# Patient Record
Sex: FEMALE | Race: WHITE | Employment: FULL TIME | URBAN - METROPOLITAN AREA
[De-identification: names, ages, dates, MRNs, and addresses within clinical notes are randomized per-mention and may not be internally consistent; named-entity substitution may affect disease eponyms.]

---

## 2019-10-02 ENCOUNTER — HOSPITAL ENCOUNTER (EMERGENCY)
Age: 29
Discharge: HOME | End: 2019-10-03
Attending: EMERGENCY MEDICINE
Payer: COMMERCIAL

## 2019-10-02 DIAGNOSIS — R30.0 DIFFICULT OR PAINFUL URINATION: ICD-10-CM

## 2019-10-02 DIAGNOSIS — R10.31 RIGHT LOWER QUADRANT ABDOMINAL PAIN: Primary | ICD-10-CM

## 2019-10-02 DIAGNOSIS — R10.9 RIGHT FLANK PAIN: ICD-10-CM

## 2019-10-02 DIAGNOSIS — R11.0 NAUSEA: ICD-10-CM

## 2019-10-02 PROCEDURE — 80053 COMPREHEN METABOLIC PANEL: CPT

## 2019-10-02 PROCEDURE — 84703 CHORIONIC GONADOTROPIN ASSAY: CPT

## 2019-10-02 PROCEDURE — 85025 COMPLETE CBC W/AUTO DIFF WBC: CPT

## 2019-10-02 PROCEDURE — 99284 EMERGENCY DEPT VISIT MOD MDM: CPT

## 2019-10-02 PROCEDURE — 83690 ASSAY OF LIPASE: CPT

## 2019-10-02 RX ORDER — CYCLOBENZAPRINE HCL 10 MG
10 TABLET ORAL 3 TIMES DAILY PRN
COMMUNITY

## 2019-10-02 RX ORDER — IBUPROFEN 800 MG/1
800 TABLET ORAL EVERY 6 HOURS PRN
COMMUNITY
End: 2019-10-03

## 2019-10-02 RX ORDER — ACETAMINOPHEN 160 MG
TABLET,DISINTEGRATING ORAL
Status: ON HOLD | COMMUNITY
End: 2020-12-23

## 2019-10-02 RX ORDER — ONDANSETRON 2 MG/ML
4 INJECTION INTRAMUSCULAR; INTRAVENOUS ONCE
Status: COMPLETED | OUTPATIENT
Start: 2019-10-03 | End: 2019-10-03

## 2019-10-02 RX ORDER — ESCITALOPRAM OXALATE 20 MG/1
20 TABLET ORAL DAILY
COMMUNITY

## 2019-10-02 RX ORDER — KETOROLAC TROMETHAMINE 30 MG/ML
30 INJECTION, SOLUTION INTRAMUSCULAR; INTRAVENOUS ONCE
Status: COMPLETED | OUTPATIENT
Start: 2019-10-03 | End: 2019-10-03

## 2019-10-02 ASSESSMENT — PAIN DESCRIPTION - LOCATION: LOCATION: ABDOMEN;FLANK;BACK

## 2019-10-02 ASSESSMENT — PAIN SCALES - GENERAL: PAINLEVEL_OUTOF10: 8

## 2019-10-03 ENCOUNTER — APPOINTMENT (OUTPATIENT)
Dept: CT IMAGING | Age: 29
End: 2019-10-03
Payer: COMMERCIAL

## 2019-10-03 VITALS
WEIGHT: 250 LBS | TEMPERATURE: 97.3 F | RESPIRATION RATE: 17 BRPM | HEART RATE: 84 BPM | HEIGHT: 62 IN | DIASTOLIC BLOOD PRESSURE: 70 MMHG | SYSTOLIC BLOOD PRESSURE: 112 MMHG | OXYGEN SATURATION: 95 % | BODY MASS INDEX: 46.01 KG/M2

## 2019-10-03 LAB
A/G RATIO: 1 (ref 1.1–2.2)
ALBUMIN SERPL-MCNC: 4.4 G/DL (ref 3.4–5)
ALP BLD-CCNC: 115 U/L (ref 40–129)
ALT SERPL-CCNC: 27 U/L (ref 10–40)
ANION GAP SERPL CALCULATED.3IONS-SCNC: 14 MMOL/L (ref 3–16)
AST SERPL-CCNC: 16 U/L (ref 15–37)
BASOPHILS ABSOLUTE: 0.1 K/UL (ref 0–0.2)
BASOPHILS RELATIVE PERCENT: 0.6 %
BILIRUB SERPL-MCNC: 0.3 MG/DL (ref 0–1)
BILIRUBIN URINE: NEGATIVE
BLOOD, URINE: NEGATIVE
BUN BLDV-MCNC: 18 MG/DL (ref 7–20)
CALCIUM SERPL-MCNC: 9.9 MG/DL (ref 8.3–10.6)
CHLORIDE BLD-SCNC: 100 MMOL/L (ref 99–110)
CLARITY: CLEAR
CO2: 23 MMOL/L (ref 21–32)
COLOR: YELLOW
CREAT SERPL-MCNC: 0.6 MG/DL (ref 0.6–1.1)
EOSINOPHILS ABSOLUTE: 0.2 K/UL (ref 0–0.6)
EOSINOPHILS RELATIVE PERCENT: 2.3 %
GFR AFRICAN AMERICAN: >60
GFR NON-AFRICAN AMERICAN: >60
GLOBULIN: 4.2 G/DL
GLUCOSE BLD-MCNC: 141 MG/DL (ref 70–99)
GLUCOSE URINE: NEGATIVE MG/DL
HCG QUALITATIVE: NEGATIVE
HCT VFR BLD CALC: 43 % (ref 36–48)
HEMOGLOBIN: 14.4 G/DL (ref 12–16)
KETONES, URINE: NEGATIVE MG/DL
LEUKOCYTE ESTERASE, URINE: NEGATIVE
LIPASE: 25 U/L (ref 13–60)
LYMPHOCYTES ABSOLUTE: 3 K/UL (ref 1–5.1)
LYMPHOCYTES RELATIVE PERCENT: 28.3 %
MCH RBC QN AUTO: 27.8 PG (ref 26–34)
MCHC RBC AUTO-ENTMCNC: 33.5 G/DL (ref 31–36)
MCV RBC AUTO: 83 FL (ref 80–100)
MICROSCOPIC EXAMINATION: NORMAL
MONOCYTES ABSOLUTE: 0.5 K/UL (ref 0–1.3)
MONOCYTES RELATIVE PERCENT: 4.4 %
NEUTROPHILS ABSOLUTE: 6.8 K/UL (ref 1.7–7.7)
NEUTROPHILS RELATIVE PERCENT: 64.4 %
NITRITE, URINE: NEGATIVE
PDW BLD-RTO: 15.1 % (ref 12.4–15.4)
PH UA: 5.5 (ref 5–8)
PLATELET # BLD: 250 K/UL (ref 135–450)
PMV BLD AUTO: 7.8 FL (ref 5–10.5)
POTASSIUM SERPL-SCNC: 3.8 MMOL/L (ref 3.5–5.1)
PROTEIN UA: NEGATIVE MG/DL
RBC # BLD: 5.18 M/UL (ref 4–5.2)
SODIUM BLD-SCNC: 137 MMOL/L (ref 136–145)
SPECIFIC GRAVITY UA: >=1.03 (ref 1–1.03)
TOTAL PROTEIN: 8.6 G/DL (ref 6.4–8.2)
URINE REFLEX TO CULTURE: NORMAL
URINE TYPE: NORMAL
UROBILINOGEN, URINE: 0.2 E.U./DL
WBC # BLD: 10.6 K/UL (ref 4–11)

## 2019-10-03 PROCEDURE — 96375 TX/PRO/DX INJ NEW DRUG ADDON: CPT

## 2019-10-03 PROCEDURE — 81003 URINALYSIS AUTO W/O SCOPE: CPT

## 2019-10-03 PROCEDURE — 2580000003 HC RX 258: Performed by: PHYSICIAN ASSISTANT

## 2019-10-03 PROCEDURE — 96361 HYDRATE IV INFUSION ADD-ON: CPT

## 2019-10-03 PROCEDURE — 74176 CT ABD & PELVIS W/O CONTRAST: CPT

## 2019-10-03 PROCEDURE — 6360000002 HC RX W HCPCS: Performed by: PHYSICIAN ASSISTANT

## 2019-10-03 PROCEDURE — 96374 THER/PROPH/DIAG INJ IV PUSH: CPT

## 2019-10-03 RX ORDER — ONDANSETRON 4 MG/1
4 TABLET, ORALLY DISINTEGRATING ORAL EVERY 8 HOURS PRN
Qty: 16 TABLET | Refills: 0 | Status: ON HOLD | OUTPATIENT
Start: 2019-10-03 | End: 2021-01-16 | Stop reason: HOSPADM

## 2019-10-03 RX ORDER — IBUPROFEN 800 MG/1
800 TABLET ORAL EVERY 8 HOURS PRN
Qty: 21 TABLET | Refills: 0 | Status: ON HOLD | OUTPATIENT
Start: 2019-10-03 | End: 2020-11-04 | Stop reason: HOSPADM

## 2019-10-03 RX ORDER — 0.9 % SODIUM CHLORIDE 0.9 %
1000 INTRAVENOUS SOLUTION INTRAVENOUS ONCE
Status: COMPLETED | OUTPATIENT
Start: 2019-10-03 | End: 2019-10-03

## 2019-10-03 RX ADMIN — KETOROLAC TROMETHAMINE 30 MG: 30 INJECTION, SOLUTION INTRAMUSCULAR at 00:14

## 2019-10-03 RX ADMIN — SODIUM CHLORIDE 1000 ML: 9 INJECTION, SOLUTION INTRAVENOUS at 01:17

## 2019-10-03 RX ADMIN — ONDANSETRON 4 MG: 2 INJECTION INTRAMUSCULAR; INTRAVENOUS at 00:12

## 2019-10-03 ASSESSMENT — PAIN SCALES - GENERAL: PAINLEVEL_OUTOF10: 8

## 2019-10-26 ENCOUNTER — APPOINTMENT (OUTPATIENT)
Dept: ULTRASOUND IMAGING | Age: 29
End: 2019-10-26
Payer: COMMERCIAL

## 2019-10-26 ENCOUNTER — HOSPITAL ENCOUNTER (EMERGENCY)
Age: 29
Discharge: HOME | End: 2019-10-26
Attending: EMERGENCY MEDICINE
Payer: COMMERCIAL

## 2019-10-26 VITALS
RESPIRATION RATE: 16 BRPM | OXYGEN SATURATION: 100 % | SYSTOLIC BLOOD PRESSURE: 104 MMHG | DIASTOLIC BLOOD PRESSURE: 63 MMHG | HEIGHT: 63 IN | HEART RATE: 70 BPM | BODY MASS INDEX: 44.3 KG/M2 | TEMPERATURE: 98.7 F | WEIGHT: 250 LBS

## 2019-10-26 DIAGNOSIS — N83.202 LEFT OVARIAN CYST: Primary | ICD-10-CM

## 2019-10-26 LAB
A/G RATIO: 1 (ref 1.1–2.2)
ALBUMIN SERPL-MCNC: 4.2 G/DL (ref 3.4–5)
ALP BLD-CCNC: 105 U/L (ref 40–129)
ALT SERPL-CCNC: 26 U/L (ref 10–40)
ANION GAP SERPL CALCULATED.3IONS-SCNC: 11 MMOL/L (ref 3–16)
AST SERPL-CCNC: 20 U/L (ref 15–37)
BACTERIA WET PREP: ABNORMAL
BANDED NEUTROPHILS RELATIVE PERCENT: 1 % (ref 0–7)
BASOPHILS ABSOLUTE: 0 K/UL (ref 0–0.2)
BASOPHILS RELATIVE PERCENT: 0 %
BILIRUB SERPL-MCNC: 0.4 MG/DL (ref 0–1)
BILIRUBIN URINE: NEGATIVE
BLOOD, URINE: NEGATIVE
BUN BLDV-MCNC: 15 MG/DL (ref 7–20)
CALCIUM SERPL-MCNC: 9.6 MG/DL (ref 8.3–10.6)
CHLORIDE BLD-SCNC: 102 MMOL/L (ref 99–110)
CLARITY: CLEAR
CLUE CELLS: ABNORMAL
CO2: 24 MMOL/L (ref 21–32)
COLOR: YELLOW
CREAT SERPL-MCNC: 0.7 MG/DL (ref 0.6–1.1)
EOSINOPHILS ABSOLUTE: 0 K/UL (ref 0–0.6)
EOSINOPHILS RELATIVE PERCENT: 0 %
EPITHELIAL CELLS WET PREP: ABNORMAL
GFR AFRICAN AMERICAN: >60
GFR NON-AFRICAN AMERICAN: >60
GLOBULIN: 4.2 G/DL
GLUCOSE BLD-MCNC: 80 MG/DL (ref 70–99)
GLUCOSE URINE: NEGATIVE MG/DL
GONADOTROPIN, CHORIONIC (HCG) QUANT: <5 MIU/ML
HCG(URINE) PREGNANCY TEST: NEGATIVE
HCT VFR BLD CALC: 41.8 % (ref 36–48)
HEMOGLOBIN: 13.9 G/DL (ref 12–16)
KETONES, URINE: NEGATIVE MG/DL
LEUKOCYTE ESTERASE, URINE: NEGATIVE
LYMPHOCYTES ABSOLUTE: 2.9 K/UL (ref 1–5.1)
LYMPHOCYTES RELATIVE PERCENT: 26 %
MCH RBC QN AUTO: 27.8 PG (ref 26–34)
MCHC RBC AUTO-ENTMCNC: 33.3 G/DL (ref 31–36)
MCV RBC AUTO: 83.4 FL (ref 80–100)
MICROSCOPIC EXAMINATION: NORMAL
MONOCYTES ABSOLUTE: 0.4 K/UL (ref 0–1.3)
MONOCYTES RELATIVE PERCENT: 4 %
NEUTROPHILS ABSOLUTE: 7.7 K/UL (ref 1.7–7.7)
NEUTROPHILS RELATIVE PERCENT: 69 %
NITRITE, URINE: NEGATIVE
PDW BLD-RTO: 14.9 % (ref 12.4–15.4)
PH UA: 5.5 (ref 5–8)
PLATELET # BLD: 216 K/UL (ref 135–450)
PLATELET SLIDE REVIEW: ADEQUATE
PMV BLD AUTO: 7.7 FL (ref 5–10.5)
POTASSIUM REFLEX MAGNESIUM: 4 MMOL/L (ref 3.5–5.1)
PROTEIN UA: NEGATIVE MG/DL
RBC # BLD: 5.01 M/UL (ref 4–5.2)
RBC WET PREP: ABNORMAL
SLIDE REVIEW: NORMAL
SODIUM BLD-SCNC: 137 MMOL/L (ref 136–145)
SOURCE WET PREP: ABNORMAL
SPECIFIC GRAVITY UA: >=1.03 (ref 1–1.03)
TOTAL PROTEIN: 8.4 G/DL (ref 6.4–8.2)
TRICHOMONAS PREP: ABNORMAL
URINE REFLEX TO CULTURE: NORMAL
URINE TYPE: NORMAL
UROBILINOGEN, URINE: 0.2 E.U./DL
WBC # BLD: 11 K/UL (ref 4–11)
WBC WET PREP: ABNORMAL
YEAST WET PREP: ABNORMAL

## 2019-10-26 PROCEDURE — 85025 COMPLETE CBC W/AUTO DIFF WBC: CPT

## 2019-10-26 PROCEDURE — 76830 TRANSVAGINAL US NON-OB: CPT

## 2019-10-26 PROCEDURE — 80053 COMPREHEN METABOLIC PANEL: CPT

## 2019-10-26 PROCEDURE — 76856 US EXAM PELVIC COMPLETE: CPT

## 2019-10-26 PROCEDURE — 99284 EMERGENCY DEPT VISIT MOD MDM: CPT

## 2019-10-26 PROCEDURE — 81003 URINALYSIS AUTO W/O SCOPE: CPT

## 2019-10-26 PROCEDURE — 84703 CHORIONIC GONADOTROPIN ASSAY: CPT

## 2019-10-26 PROCEDURE — 96374 THER/PROPH/DIAG INJ IV PUSH: CPT

## 2019-10-26 PROCEDURE — 6360000002 HC RX W HCPCS: Performed by: EMERGENCY MEDICINE

## 2019-10-26 PROCEDURE — 87491 CHLMYD TRACH DNA AMP PROBE: CPT

## 2019-10-26 PROCEDURE — 87591 N.GONORRHOEAE DNA AMP PROB: CPT

## 2019-10-26 PROCEDURE — 84702 CHORIONIC GONADOTROPIN TEST: CPT

## 2019-10-26 PROCEDURE — 87210 SMEAR WET MOUNT SALINE/INK: CPT

## 2019-10-26 RX ORDER — KETOROLAC TROMETHAMINE 30 MG/ML
15 INJECTION, SOLUTION INTRAMUSCULAR; INTRAVENOUS ONCE
Status: COMPLETED | OUTPATIENT
Start: 2019-10-26 | End: 2019-10-26

## 2019-10-26 RX ORDER — ONDANSETRON 4 MG/1
4 TABLET, FILM COATED ORAL DAILY PRN
Qty: 15 TABLET | Refills: 0 | Status: SHIPPED | OUTPATIENT
Start: 2019-10-26

## 2019-10-26 RX ADMIN — KETOROLAC TROMETHAMINE 15 MG: 30 INJECTION, SOLUTION INTRAMUSCULAR at 14:30

## 2019-10-26 ASSESSMENT — PAIN DESCRIPTION - DESCRIPTORS: DESCRIPTORS: CRAMPING;SHARP;SHOOTING

## 2019-10-26 ASSESSMENT — PAIN DESCRIPTION - LOCATION: LOCATION: ABDOMEN

## 2019-10-26 ASSESSMENT — PAIN DESCRIPTION - PAIN TYPE: TYPE: ACUTE PAIN

## 2019-10-26 ASSESSMENT — PAIN SCALES - GENERAL
PAINLEVEL_OUTOF10: 8
PAINLEVEL_OUTOF10: 8

## 2019-10-26 ASSESSMENT — PAIN DESCRIPTION - ORIENTATION: ORIENTATION: LOWER

## 2019-10-28 LAB
C TRACH DNA GENITAL QL NAA+PROBE: NEGATIVE
N. GONORRHOEAE DNA: NEGATIVE

## 2020-10-30 ENCOUNTER — HOSPITAL ENCOUNTER (OUTPATIENT)
Age: 30
Discharge: HOME | End: 2020-10-30
Payer: COMMERCIAL

## 2020-10-30 PROCEDURE — U0003 INFECTIOUS AGENT DETECTION BY NUCLEIC ACID (DNA OR RNA); SEVERE ACUTE RESPIRATORY SYNDROME CORONAVIRUS 2 (SARS-COV-2) (CORONAVIRUS DISEASE [COVID-19]), AMPLIFIED PROBE TECHNIQUE, MAKING USE OF HIGH THROUGHPUT TECHNOLOGIES AS DESCRIBED BY CMS-2020-01-R: HCPCS

## 2020-10-30 PROCEDURE — U0002 COVID-19 LAB TEST NON-CDC: HCPCS

## 2020-10-31 LAB — SARS-COV-2, PCR: NOT DETECTED

## 2020-11-02 ENCOUNTER — ANESTHESIA EVENT (OUTPATIENT)
Dept: OPERATING ROOM | Age: 30
End: 2020-11-02
Payer: COMMERCIAL

## 2020-11-04 ENCOUNTER — ANESTHESIA (OUTPATIENT)
Dept: OPERATING ROOM | Age: 30
End: 2020-11-04
Payer: COMMERCIAL

## 2020-11-04 ENCOUNTER — HOSPITAL ENCOUNTER (OUTPATIENT)
Age: 30
Setting detail: OUTPATIENT SURGERY
Discharge: HOME | End: 2020-11-04
Attending: PODIATRIST | Admitting: PODIATRIST
Payer: COMMERCIAL

## 2020-11-04 VITALS
HEIGHT: 63 IN | OXYGEN SATURATION: 96 % | SYSTOLIC BLOOD PRESSURE: 125 MMHG | WEIGHT: 254 LBS | HEART RATE: 90 BPM | BODY MASS INDEX: 45 KG/M2 | RESPIRATION RATE: 16 BRPM | DIASTOLIC BLOOD PRESSURE: 71 MMHG | TEMPERATURE: 97 F

## 2020-11-04 VITALS
SYSTOLIC BLOOD PRESSURE: 133 MMHG | DIASTOLIC BLOOD PRESSURE: 62 MMHG | TEMPERATURE: 95.5 F | RESPIRATION RATE: 10 BRPM | OXYGEN SATURATION: 97 %

## 2020-11-04 LAB — PREGNANCY, URINE: NEGATIVE

## 2020-11-04 PROCEDURE — 7100000001 HC PACU RECOVERY - ADDTL 15 MIN: Performed by: PODIATRIST

## 2020-11-04 PROCEDURE — 2720000010 HC SURG SUPPLY STERILE: Performed by: PODIATRIST

## 2020-11-04 PROCEDURE — C1713 ANCHOR/SCREW BN/BN,TIS/BN: HCPCS | Performed by: PODIATRIST

## 2020-11-04 PROCEDURE — 6360000002 HC RX W HCPCS: Performed by: ANESTHESIOLOGY

## 2020-11-04 PROCEDURE — 84703 CHORIONIC GONADOTROPIN ASSAY: CPT

## 2020-11-04 PROCEDURE — 2709999900 HC NON-CHARGEABLE SUPPLY: Performed by: PODIATRIST

## 2020-11-04 PROCEDURE — 6360000002 HC RX W HCPCS: Performed by: PODIATRIST

## 2020-11-04 PROCEDURE — 7100000011 HC PHASE II RECOVERY - ADDTL 15 MIN: Performed by: PODIATRIST

## 2020-11-04 PROCEDURE — 2580000003 HC RX 258: Performed by: ANESTHESIOLOGY

## 2020-11-04 PROCEDURE — 3700000000 HC ANESTHESIA ATTENDED CARE: Performed by: PODIATRIST

## 2020-11-04 PROCEDURE — 3600000003 HC SURGERY LEVEL 3 BASE: Performed by: PODIATRIST

## 2020-11-04 PROCEDURE — 7100000000 HC PACU RECOVERY - FIRST 15 MIN: Performed by: PODIATRIST

## 2020-11-04 PROCEDURE — 3700000001 HC ADD 15 MINUTES (ANESTHESIA): Performed by: PODIATRIST

## 2020-11-04 PROCEDURE — 2500000003 HC RX 250 WO HCPCS: Performed by: PODIATRIST

## 2020-11-04 PROCEDURE — 6360000002 HC RX W HCPCS: Performed by: NURSE ANESTHETIST, CERTIFIED REGISTERED

## 2020-11-04 PROCEDURE — 2500000003 HC RX 250 WO HCPCS: Performed by: NURSE ANESTHETIST, CERTIFIED REGISTERED

## 2020-11-04 PROCEDURE — 3600000013 HC SURGERY LEVEL 3 ADDTL 15MIN: Performed by: PODIATRIST

## 2020-11-04 PROCEDURE — 7100000010 HC PHASE II RECOVERY - FIRST 15 MIN: Performed by: PODIATRIST

## 2020-11-04 DEVICE — ANCHOR SUT DIA2.9MM NO2 MAXBRAID DBL LD SFT W/ CUT NDL: Type: IMPLANTABLE DEVICE | Site: FOOT | Status: FUNCTIONAL

## 2020-11-04 DEVICE — IMPLANTABLE DEVICE: Type: IMPLANTABLE DEVICE | Site: FOOT | Status: FUNCTIONAL

## 2020-11-04 RX ORDER — ONDANSETRON 2 MG/ML
4 INJECTION INTRAMUSCULAR; INTRAVENOUS EVERY 10 MIN PRN
Status: DISCONTINUED | OUTPATIENT
Start: 2020-11-04 | End: 2020-11-04 | Stop reason: HOSPADM

## 2020-11-04 RX ORDER — LIDOCAINE HYDROCHLORIDE 20 MG/ML
INJECTION, SOLUTION EPIDURAL; INFILTRATION; INTRACAUDAL; PERINEURAL PRN
Status: DISCONTINUED | OUTPATIENT
Start: 2020-11-04 | End: 2020-11-04 | Stop reason: SDUPTHER

## 2020-11-04 RX ORDER — LABETALOL HYDROCHLORIDE 5 MG/ML
5 INJECTION, SOLUTION INTRAVENOUS EVERY 10 MIN PRN
Status: DISCONTINUED | OUTPATIENT
Start: 2020-11-04 | End: 2020-11-04 | Stop reason: HOSPADM

## 2020-11-04 RX ORDER — FENTANYL CITRATE 50 UG/ML
INJECTION, SOLUTION INTRAMUSCULAR; INTRAVENOUS PRN
Status: DISCONTINUED | OUTPATIENT
Start: 2020-11-04 | End: 2020-11-04 | Stop reason: SDUPTHER

## 2020-11-04 RX ORDER — SODIUM CHLORIDE, SODIUM LACTATE, POTASSIUM CHLORIDE, CALCIUM CHLORIDE 600; 310; 30; 20 MG/100ML; MG/100ML; MG/100ML; MG/100ML
INJECTION, SOLUTION INTRAVENOUS CONTINUOUS
Status: DISCONTINUED | OUTPATIENT
Start: 2020-11-04 | End: 2020-11-04 | Stop reason: HOSPADM

## 2020-11-04 RX ORDER — SODIUM CHLORIDE 0.9 % (FLUSH) 0.9 %
10 SYRINGE (ML) INJECTION PRN
Status: DISCONTINUED | OUTPATIENT
Start: 2020-11-04 | End: 2020-11-04 | Stop reason: HOSPADM

## 2020-11-04 RX ORDER — HYDRALAZINE HYDROCHLORIDE 20 MG/ML
5 INJECTION INTRAMUSCULAR; INTRAVENOUS EVERY 10 MIN PRN
Status: DISCONTINUED | OUTPATIENT
Start: 2020-11-04 | End: 2020-11-04 | Stop reason: HOSPADM

## 2020-11-04 RX ORDER — LIDOCAINE HYDROCHLORIDE 10 MG/ML
INJECTION, SOLUTION EPIDURAL; INFILTRATION; INTRACAUDAL; PERINEURAL
Status: COMPLETED
Start: 2020-11-04 | End: 2020-11-04

## 2020-11-04 RX ORDER — PROPOFOL 10 MG/ML
INJECTION, EMULSION INTRAVENOUS PRN
Status: DISCONTINUED | OUTPATIENT
Start: 2020-11-04 | End: 2020-11-04 | Stop reason: SDUPTHER

## 2020-11-04 RX ORDER — SODIUM CHLORIDE 0.9 % (FLUSH) 0.9 %
10 SYRINGE (ML) INJECTION EVERY 12 HOURS SCHEDULED
Status: DISCONTINUED | OUTPATIENT
Start: 2020-11-04 | End: 2020-11-04 | Stop reason: HOSPADM

## 2020-11-04 RX ORDER — DEXAMETHASONE SODIUM PHOSPHATE 4 MG/ML
INJECTION, SOLUTION INTRA-ARTICULAR; INTRALESIONAL; INTRAMUSCULAR; INTRAVENOUS; SOFT TISSUE PRN
Status: DISCONTINUED | OUTPATIENT
Start: 2020-11-04 | End: 2020-11-04 | Stop reason: SDUPTHER

## 2020-11-04 RX ORDER — ONDANSETRON 2 MG/ML
INJECTION INTRAMUSCULAR; INTRAVENOUS PRN
Status: DISCONTINUED | OUTPATIENT
Start: 2020-11-04 | End: 2020-11-04 | Stop reason: SDUPTHER

## 2020-11-04 RX ORDER — OXYCODONE HYDROCHLORIDE AND ACETAMINOPHEN 5; 325 MG/1; MG/1
2 TABLET ORAL PRN
Status: DISCONTINUED | OUTPATIENT
Start: 2020-11-04 | End: 2020-11-04 | Stop reason: HOSPADM

## 2020-11-04 RX ORDER — OXYCODONE HYDROCHLORIDE AND ACETAMINOPHEN 5; 325 MG/1; MG/1
1 TABLET ORAL PRN
Status: DISCONTINUED | OUTPATIENT
Start: 2020-11-04 | End: 2020-11-04 | Stop reason: HOSPADM

## 2020-11-04 RX ORDER — BUPIVACAINE HYDROCHLORIDE 5 MG/ML
INJECTION, SOLUTION EPIDURAL; INTRACAUDAL PRN
Status: DISCONTINUED | OUTPATIENT
Start: 2020-11-04 | End: 2020-11-04 | Stop reason: ALTCHOICE

## 2020-11-04 RX ADMIN — ONDANSETRON 4 MG: 2 INJECTION, SOLUTION INTRAMUSCULAR; INTRAVENOUS at 07:25

## 2020-11-04 RX ADMIN — LIDOCAINE HYDROCHLORIDE 60 MG: 20 INJECTION, SOLUTION EPIDURAL; INFILTRATION; INTRACAUDAL; PERINEURAL at 07:25

## 2020-11-04 RX ADMIN — FENTANYL CITRATE 25 MCG: 50 INJECTION INTRAMUSCULAR; INTRAVENOUS at 09:21

## 2020-11-04 RX ADMIN — Medication 2 G: at 07:25

## 2020-11-04 RX ADMIN — FENTANYL CITRATE 25 MCG: 50 INJECTION INTRAMUSCULAR; INTRAVENOUS at 08:38

## 2020-11-04 RX ADMIN — PROPOFOL 200 MG: 10 INJECTION, EMULSION INTRAVENOUS at 07:25

## 2020-11-04 RX ADMIN — SODIUM CHLORIDE, POTASSIUM CHLORIDE, SODIUM LACTATE AND CALCIUM CHLORIDE: 600; 310; 30; 20 INJECTION, SOLUTION INTRAVENOUS at 06:34

## 2020-11-04 RX ADMIN — FENTANYL CITRATE 50 MCG: 50 INJECTION INTRAMUSCULAR; INTRAVENOUS at 07:25

## 2020-11-04 RX ADMIN — FENTANYL CITRATE 25 MCG: 50 INJECTION INTRAMUSCULAR; INTRAVENOUS at 08:06

## 2020-11-04 RX ADMIN — PROPOFOL 100 MG: 10 INJECTION, EMULSION INTRAVENOUS at 07:42

## 2020-11-04 RX ADMIN — LIDOCAINE HYDROCHLORIDE 0.1 ML: 10 INJECTION, SOLUTION EPIDURAL; INFILTRATION; INTRACAUDAL; PERINEURAL at 06:33

## 2020-11-04 RX ADMIN — FENTANYL CITRATE 25 MCG: 50 INJECTION INTRAMUSCULAR; INTRAVENOUS at 07:39

## 2020-11-04 RX ADMIN — DEXAMETHASONE SODIUM PHOSPHATE 10 MG: 4 INJECTION, SOLUTION INTRAMUSCULAR; INTRAVENOUS at 07:25

## 2020-11-04 RX ADMIN — HYDROMORPHONE HYDROCHLORIDE 0.5 MG: 1 INJECTION, SOLUTION INTRAMUSCULAR; INTRAVENOUS; SUBCUTANEOUS at 10:16

## 2020-11-04 RX ADMIN — FENTANYL CITRATE 25 MCG: 50 INJECTION INTRAMUSCULAR; INTRAVENOUS at 07:28

## 2020-11-04 RX ADMIN — HYDROMORPHONE HYDROCHLORIDE 0.5 MG: 1 INJECTION, SOLUTION INTRAMUSCULAR; INTRAVENOUS; SUBCUTANEOUS at 10:06

## 2020-11-04 RX ADMIN — FENTANYL CITRATE 25 MCG: 50 INJECTION INTRAMUSCULAR; INTRAVENOUS at 08:00

## 2020-11-04 ASSESSMENT — PULMONARY FUNCTION TESTS
PIF_VALUE: 6
PIF_VALUE: 8
PIF_VALUE: 7
PIF_VALUE: 7
PIF_VALUE: 1
PIF_VALUE: 7
PIF_VALUE: 7
PIF_VALUE: 8
PIF_VALUE: 10
PIF_VALUE: 7
PIF_VALUE: 0
PIF_VALUE: 7
PIF_VALUE: 8
PIF_VALUE: 6
PIF_VALUE: 8
PIF_VALUE: 7
PIF_VALUE: 5
PIF_VALUE: 6
PIF_VALUE: 7
PIF_VALUE: 7
PIF_VALUE: 25
PIF_VALUE: 7
PIF_VALUE: 6
PIF_VALUE: 7
PIF_VALUE: 5
PIF_VALUE: 7
PIF_VALUE: 8
PIF_VALUE: 11
PIF_VALUE: 7
PIF_VALUE: 22
PIF_VALUE: 2
PIF_VALUE: 10
PIF_VALUE: 7
PIF_VALUE: 7
PIF_VALUE: 8
PIF_VALUE: 7
PIF_VALUE: 6
PIF_VALUE: 8
PIF_VALUE: 12
PIF_VALUE: 1
PIF_VALUE: 6
PIF_VALUE: 6
PIF_VALUE: 18
PIF_VALUE: 7
PIF_VALUE: 8
PIF_VALUE: 7
PIF_VALUE: 5
PIF_VALUE: 7
PIF_VALUE: 8
PIF_VALUE: 8
PIF_VALUE: 6
PIF_VALUE: 8
PIF_VALUE: 7
PIF_VALUE: 8
PIF_VALUE: 8
PIF_VALUE: 7
PIF_VALUE: 6
PIF_VALUE: 7
PIF_VALUE: 8
PIF_VALUE: 7
PIF_VALUE: 7
PIF_VALUE: 38
PIF_VALUE: 7
PIF_VALUE: 7
PIF_VALUE: 6
PIF_VALUE: 7
PIF_VALUE: 7
PIF_VALUE: 6
PIF_VALUE: 5
PIF_VALUE: 2
PIF_VALUE: 7
PIF_VALUE: 8
PIF_VALUE: 6
PIF_VALUE: 7
PIF_VALUE: 5
PIF_VALUE: 8
PIF_VALUE: 7
PIF_VALUE: 8
PIF_VALUE: 7
PIF_VALUE: 7
PIF_VALUE: 8
PIF_VALUE: 8
PIF_VALUE: 7
PIF_VALUE: 8
PIF_VALUE: 2
PIF_VALUE: 7
PIF_VALUE: 8
PIF_VALUE: 7
PIF_VALUE: 3
PIF_VALUE: 7
PIF_VALUE: 7
PIF_VALUE: 5
PIF_VALUE: 7
PIF_VALUE: 0
PIF_VALUE: 8
PIF_VALUE: 8
PIF_VALUE: 5
PIF_VALUE: 8
PIF_VALUE: 7
PIF_VALUE: 5
PIF_VALUE: 7
PIF_VALUE: 8
PIF_VALUE: 8
PIF_VALUE: 10
PIF_VALUE: 7
PIF_VALUE: 6
PIF_VALUE: 8
PIF_VALUE: 5
PIF_VALUE: 7
PIF_VALUE: 8
PIF_VALUE: 7
PIF_VALUE: 23
PIF_VALUE: 6
PIF_VALUE: 7
PIF_VALUE: 3
PIF_VALUE: 7
PIF_VALUE: 5
PIF_VALUE: 7
PIF_VALUE: 5
PIF_VALUE: 7
PIF_VALUE: 7
PIF_VALUE: 8
PIF_VALUE: 5
PIF_VALUE: 7

## 2020-11-04 ASSESSMENT — PAIN SCALES - GENERAL
PAINLEVEL_OUTOF10: 7
PAINLEVEL_OUTOF10: 7
PAINLEVEL_OUTOF10: 4

## 2020-11-04 ASSESSMENT — PAIN DESCRIPTION - ORIENTATION
ORIENTATION: LEFT

## 2020-11-04 ASSESSMENT — PAIN DESCRIPTION - DESCRIPTORS
DESCRIPTORS: ACHING;PRESSURE
DESCRIPTORS: ACHING;PRESSURE
DESCRIPTORS: DULL;ACHING;CONSTANT
DESCRIPTORS: ACHING;PRESSURE

## 2020-11-04 ASSESSMENT — PAIN DESCRIPTION - PAIN TYPE
TYPE: SURGICAL PAIN

## 2020-11-04 ASSESSMENT — PAIN - FUNCTIONAL ASSESSMENT
PAIN_FUNCTIONAL_ASSESSMENT: ACTIVITIES ARE NOT PREVENTED
PAIN_FUNCTIONAL_ASSESSMENT: 0-10

## 2020-11-04 ASSESSMENT — PAIN DESCRIPTION - LOCATION
LOCATION: FOOT

## 2020-11-04 NOTE — OP NOTE
[address]                 [address] [address]                                OPERATIVE REPORT    PATIENT NAME: Caitlin Rollins                        :        1990  MED REC NO:   [id number]                          ROOM:  ACCOUNT NO:   [de-identified]                           ADMIT DATE: 2020  PROVIDER:     Cheryl Schwarz DPM    DATE OF PROCEDURE:  2020    PREOPERATIVE DIAGNOSES:  1.  Subtalar coalition. 2.  Calcaneonavicular coalition. 3.  Severe valgus deformity with posterior tibial tendonitis. POSTOPERATIVE DIAGNOSES:  1.  Subtalar coalition. 2.  Calcaneonavicular coalition. 3.  Attenuated spring ligament and posterior tibial tendon and scarred  lateral ankle ligament. OPERATION PERFORMED:  1. Subtalar joint coalition resection and subtalar joint fusion. 2.  Calcaneonavicular coalition resection. 3.  Kidner procedure with reefing of the spring ligament. SURGEON:  Glenny Burton DPM    ANESTHESIA:  General.    HEMOSTASIS:  Pneumatic calf tourniquet at 275 mmHg. EBL:  Less than 100 mL. MATERIALS USED:  See the brief op note for the hardware and supplies  used. OPERATIVE PROCEDURE:  The patient was brought to the operating room,  placed on the operating table in supine position. The patient was then  placed under general anesthesia. Left foot and ankle were scrubbed and  draped in usual sterile aseptic manner. An Esmarch bandage was used to  exsanguinate the left foot and ankle, and a well-padded calf tourniquet  was inflated to 275 mmHg. PROCEDURE #1:  Resection of subtalar coalition and subtalar joint  fusion:  Attention was directed at the medial aspect of the foot and  ankle. Approximately 6 cm incision was placed over the medial aspect of  the foot and ankle, which was carried down deep to the skin and  subcutaneous tissue. All bleeding vessels were ligated.   Next, the  posterior tibial tendon was identified and retracted plantarly. An  incision was made along the medial aspect of the subtalar joint, or  where the joint would be, no obvious joint was noted due to bony  coalition in this area. The lateral aspect of the joint was identified  using C-arm. The medial coalition was resected. A wedge of bone was  resected from talus and calcaneus, so the entire joint can be mobilized. Significant scarring was noted along the lateral foot and ankle,  although this had to be released including scarring along the lateral  subtalar joint and ankle joint to mobilize this joint. Once the joint  was mobilized, the calcaneus was inverted to bring into rectus to 2  degrees of valgus position. A reconstructive wedge from [employer] was used  to hold this correction. Good alignment of the heel, talus and the leg  were noted. Subtalar joint was then fused using standard AO techniques  and 5-0 screw. Good stabilization of this joint was noted. PROCEDURE #2:  Attention was directed at the dorsolateral aspect of the  left foot. Approximately 6 cm incision was placed here as well. Careful sharp and dull dissection was carried down deep to the skin and  subcutaneous tissue. The extensor digitorum brevis muscle was  identified and its origin was reflected off of the calcaneus exposing  the anterior process of the calcaneus, which was noted to have bony  coalition with the lateral aspect of navicular. Using sagittal saw and  osteotome, this coalition was resected. This procedure was performed  prior to fixation of the subtalar joint to help mobilize the entire foot  and bring it more into a neutral position and out of the valgus  position. Care was taken to remove all of the coalition all the way to  the plantar aspect of both these bones to help mobilize the foot. After  adequate resection and fixation of the subtalar joint, the lateral  aspect of the incision was closed in layer fashion.   Since the foot was  in severe valgus and was brought into rectus position, significant  tension was noted in the tissues in this area including the extensor  digitorum brevis muscle as well as subcutaneous tissue and the skin. These were closed in layer fashion using 3-0 Vicryl sutures and 3-0  nylon sutures. PROCEDURE #3:  Kidner procedure:  Attention was directed at the medial  soft tissue structure. Significantly attenuated posterior tibial tendon  was noted and at this length, the tendon would not be functional, so  Kidner procedure was performed. The tendon was resected off the medial  navicular tuberosity and reattached using an anchor it was reattached in more anatomically tension. The spring ligament was also tightened  medially. Next, the wound was irrigated with copious amount of normal  saline. 3-0 Vicryl sutures were used to reapproximate the peritenon  over the posterior tibial tendon. 3-0 Vicryl sutures were also used to  reapproximate the subcutaneous tissue and 4-0 Vicryl sutures were used  to reapproximate the skin edges in a continuous subcuticular manner. Next,  approximately 20 mL of 0.5% Marcaine was injected into the foot and  ankle. Calf tourniquet was deflated at this time. Circulation was  noted to return to left foot almost instantaneously. Mildly compressive  dressing consisting of Adaptic, 4 x 4, and Ivett were applied followed  by posterior splint at 90 degrees and Ace bandages. The patient  tolerated anesthesia and procedure well and left the operating room with  vital signs stable and neurovascular status intact.         Ayah Andrews DPM    D: 11/04/2020 9:59:38       T: 11/04/2020 12:51:06     [email or web address]  Job#: [id number]     Doc#: [id number]    CC:

## 2020-11-04 NOTE — ANESTHESIA PRE PROCEDURE
Department of Anesthesiology  Preprocedure Note       Name:  Steve Levine   Age:  26 y.o.  :  1990                                          MRN:  [id number]         Date:  2020      Surgeon: Bridgette Morales):  Eri Johnston DPM    Procedure: Procedure(s):  SUBTALAR JOINT FUSION, CALCANEO-NAVICULAR COALITION RESECTION LEFT FOOT    Medications prior to admission:   Prior to Admission medications    Medication Sig Start Date End Date Taking? Authorizing Provider   BUPROPION HCL PO Take by mouth   Yes Historical Provider, MD   ondansetron (ZOFRAN) 4 MG tablet Take 1 tablet by mouth daily as needed for Nausea or Vomiting 10/26/19   Lucille Lion, DO   ibuprofen (ADVIL;MOTRIN) 800 MG tablet Take 1 tablet by mouth every 8 hours as needed for Pain With food 10/3/19 10/26/19  David Lares, DO   ondansetron (ZOFRAN ODT) 4 MG disintegrating tablet Take 1 tablet by mouth every 8 hours as needed for Nausea or Vomiting 10/3/19   David Lraes, DO   cyclobenzaprine (FLEXERIL) 10 MG tablet Take 10 mg by mouth 3 times daily as needed for Muscle spasms    Historical Provider, MD   escitalopram (LEXAPRO) 20 MG tablet Take 20 mg by mouth daily    Historical Provider, MD   Cholecalciferol (VITAMIN D3) 2000 units CAPS Take by mouth    Historical Provider, MD   beclomethasone (QVAR REDIHALER) 80 MCG/ACT AERB inhaler Inhale 1 puff into the lungs 2 times daily    Historical Provider, MD       Current medications:    No current facility-administered medications for this encounter.       Current Outpatient Medications   Medication Sig Dispense Refill    BUPROPION HCL PO Take by mouth      ondansetron (ZOFRAN) 4 MG tablet Take 1 tablet by mouth daily as needed for Nausea or Vomiting 15 tablet 0    ibuprofen (ADVIL;MOTRIN) 800 MG tablet Take 1 tablet by mouth every 8 hours as needed for Pain With food 21 tablet 0    ondansetron (ZOFRAN ODT) 4 MG disintegrating tablet Take 1 tablet by mouth every 8 hours as needed for Nausea or Vomiting 16 tablet 0    cyclobenzaprine (FLEXERIL) 10 MG tablet Take 10 mg by mouth 3 times daily as needed for Muscle spasms      escitalopram (LEXAPRO) 20 MG tablet Take 20 mg by mouth daily      Cholecalciferol (VITAMIN D3) 2000 units CAPS Take by mouth      beclomethasone (QVAR REDIHALER) 80 MCG/ACT AERB inhaler Inhale 1 puff into the lungs 2 times daily         Allergies:  No Known Allergies    Problem List:  There is no problem list on file for this patient. Past Medical History:        Diagnosis Date    Anxiety     Asthma     Depression     Kidney stone     Vitamin D deficiency        Past Surgical History:        Procedure Laterality Date    LEEP         Social History:    Social History     Tobacco Use    Smoking status: Former Smoker     Packs/day: 0.25     Types: Cigarettes    Smokeless tobacco: Never Used   Substance Use Topics    Alcohol use: Yes     Comment: rare                                Counseling given: Not Answered      Vital Signs (Current):   Vitals:    11/02/20 1024   Weight: 254 lb (115.2 kg)   Height: 5' 3\" (1.6 m)                                              BP Readings from Last 3 Encounters:   10/26/19 104/63   10/03/19 112/70       NPO Status:                                                                                 BMI:   Wt Readings from Last 3 Encounters:   11/02/20 254 lb (115.2 kg)   10/26/19 250 lb (113.4 kg)   10/02/19 250 lb (113.4 kg)     Body mass index is 44.99 kg/m².     CBC:   Lab Results   Component Value Date    WBC 11.0 10/26/2019    RBC 5.01 10/26/2019    HGB 13.9 10/26/2019    HCT 41.8 10/26/2019    MCV 83.4 10/26/2019    RDW 14.9 10/26/2019     10/26/2019       CMP:   Lab Results   Component Value Date     10/26/2019    K 4.0 10/26/2019     10/26/2019    CO2 24 10/26/2019    BUN 15 10/26/2019    CREATININE 0.7 10/26/2019    GFRAA >60 10/26/2019    AGRATIO 1.0 10/26/2019    LABGLOM >60 10/26/2019    GLUCOSE 80 10/26/2019 PROT 8.4 10/26/2019    CALCIUM 9.6 10/26/2019    BILITOT 0.4 10/26/2019    ALKPHOS 105 10/26/2019    AST 20 10/26/2019    ALT 26 10/26/2019       POC Tests: No results for input(s): POCGLU, POCNA, POCK, POCCL, POCBUN, POCHEMO, POCHCT in the last 72 hours. Coags: No results found for: PROTIME, INR, APTT    HCG (If Applicable):   Lab Results   Component Value Date    PREGTESTUR Negative 10/26/2019        ABGs: No results found for: PHART, PO2ART, QNA5BRL, NYT7NHZ, BEART, H7JCZVWV     Type & Screen (If Applicable):  No results found for: LABABO, LABRH    Drug/Infectious Status (If Applicable):  No results found for: HIV, HEPCAB    COVID-19 Screening (If Applicable):   Lab Results   Component Value Date    COVID19 Not Detected 10/30/2020         Anesthesia Evaluation  Patient summary reviewed and Nursing notes reviewed no history of anesthetic complications:   Airway: Mallampati: II  TM distance: >3 FB   Neck ROM: full  Mouth opening: > = 3 FB Dental: normal exam         Pulmonary:   (+) asthma:                            Cardiovascular:Negative CV ROS                      Neuro/Psych:   (+) psychiatric history:            GI/Hepatic/Renal: Neg GI/Hepatic/Renal ROS       (-) GERD, liver disease and no renal disease       Endo/Other: Negative Endo/Other ROS       (-) diabetes mellitus               Abdominal:           Vascular: negative vascular ROS. Anesthesia Plan      general     ASA 3     (I discussed with the patient the risks and benefits of PIV, general anesthesia, IV Narcotics, PACU. All questions were answered the patient agrees with the plan)  Induction: intravenous. MIPS: Prophylactic antiemetics administered. Anesthetic plan and risks discussed with patient. Plan discussed with CRNA.                   Zamzam Anne MD   11/4/2020

## 2020-11-04 NOTE — BRIEF OP NOTE
Operative Note      Patient: Alicia Colby  YOB: 1990  MRN: [id number]    Date of Procedure: 11/4/2020    Pre-Op Diagnosis: subtalar coalition and djd, calc-nuv colation    Post-Op Diagnosis: Same       Procedure(s):  SUBTALAR JOINT FUSION, CALCANEO-NAVICULAR COALITION RESECTION LEFT FOOT   Kidner procedure    Surgeon(s):  Ana Maria Naidu DPM    Assistant:   * No surgical staff found *    Anesthesia: General    Estimated Blood Loss (mL): less than 229     Complications: None    Specimens:   * No specimens in log *    Implants:  Implant Name Type Inv. Item Serial No.  Lot No. LRB No. Used Action   [employer] [id number] Left 1 Implanted   [employer] [id number] Left 1 Implanted   reconstructive wedge    [employer] [id number] Left 1 Implanted   screw    [employer]  Left 1 Implanted   ANCHOR SUT DIA2. 9MM NO2 MAXBRAID DBL LD SFT W/ CUT NDL  ANCHOR SUT DIA2. 9MM NO2 MAXBRAID DBL LD SFT W/ CUT NDL  [employer] [id number] Left 1 Implanted   RAYSA SCR THD 5.0X75MM  RAYSA SCR THD 5.0X75MM  [employer]  Left 1 Implanted         Drains: * No LDAs found *    Findings: PT tendon and spring ligaments were noted to be attenuated, lateral ankle ligaments were noted to be fibrosed and tight.       Electronically signed by Ana Maria Naidu DPM on 11/4/2020 at 9:48 AM

## 2020-12-18 ENCOUNTER — HOSPITAL ENCOUNTER (OUTPATIENT)
Age: 30
Discharge: HOME | End: 2020-12-18
Payer: COMMERCIAL

## 2020-12-18 LAB — SARS-COV-2: NOT DETECTED

## 2020-12-18 PROCEDURE — U0003 INFECTIOUS AGENT DETECTION BY NUCLEIC ACID (DNA OR RNA); SEVERE ACUTE RESPIRATORY SYNDROME CORONAVIRUS 2 (SARS-COV-2) (CORONAVIRUS DISEASE [COVID-19]), AMPLIFIED PROBE TECHNIQUE, MAKING USE OF HIGH THROUGHPUT TECHNOLOGIES AS DESCRIBED BY CMS-2020-01-R: HCPCS

## 2020-12-23 ENCOUNTER — ANESTHESIA (OUTPATIENT)
Dept: OPERATING ROOM | Age: 30
End: 2020-12-23
Payer: COMMERCIAL

## 2020-12-23 ENCOUNTER — HOSPITAL ENCOUNTER (OUTPATIENT)
Age: 30
Setting detail: OUTPATIENT SURGERY
Discharge: HOME | End: 2020-12-23
Attending: PODIATRIST | Admitting: PODIATRIST
Payer: COMMERCIAL

## 2020-12-23 ENCOUNTER — ANESTHESIA EVENT (OUTPATIENT)
Dept: OPERATING ROOM | Age: 30
End: 2020-12-23
Payer: COMMERCIAL

## 2020-12-23 VITALS
TEMPERATURE: 97.5 F | RESPIRATION RATE: 15 BRPM | SYSTOLIC BLOOD PRESSURE: 132 MMHG | BODY MASS INDEX: 42.52 KG/M2 | DIASTOLIC BLOOD PRESSURE: 82 MMHG | OXYGEN SATURATION: 98 % | HEART RATE: 86 BPM | WEIGHT: 240 LBS | HEIGHT: 63 IN

## 2020-12-23 VITALS
RESPIRATION RATE: 19 BRPM | SYSTOLIC BLOOD PRESSURE: 115 MMHG | OXYGEN SATURATION: 100 % | TEMPERATURE: 98.2 F | DIASTOLIC BLOOD PRESSURE: 63 MMHG

## 2020-12-23 LAB — PREGNANCY, URINE: NEGATIVE

## 2020-12-23 PROCEDURE — 7100000011 HC PHASE II RECOVERY - ADDTL 15 MIN: Performed by: PODIATRIST

## 2020-12-23 PROCEDURE — 6360000002 HC RX W HCPCS: Performed by: PODIATRIST

## 2020-12-23 PROCEDURE — 2500000003 HC RX 250 WO HCPCS: Performed by: PODIATRIST

## 2020-12-23 PROCEDURE — 6360000002 HC RX W HCPCS: Performed by: NURSE ANESTHETIST, CERTIFIED REGISTERED

## 2020-12-23 PROCEDURE — 2500000003 HC RX 250 WO HCPCS: Performed by: NURSE ANESTHETIST, CERTIFIED REGISTERED

## 2020-12-23 PROCEDURE — 84703 CHORIONIC GONADOTROPIN ASSAY: CPT

## 2020-12-23 PROCEDURE — 2709999900 HC NON-CHARGEABLE SUPPLY: Performed by: PODIATRIST

## 2020-12-23 PROCEDURE — 3700000001 HC ADD 15 MINUTES (ANESTHESIA): Performed by: PODIATRIST

## 2020-12-23 PROCEDURE — 3600000004 HC SURGERY LEVEL 4 BASE: Performed by: PODIATRIST

## 2020-12-23 PROCEDURE — 6370000000 HC RX 637 (ALT 250 FOR IP): Performed by: PODIATRIST

## 2020-12-23 PROCEDURE — 7100000000 HC PACU RECOVERY - FIRST 15 MIN: Performed by: PODIATRIST

## 2020-12-23 PROCEDURE — 3600000014 HC SURGERY LEVEL 4 ADDTL 15MIN: Performed by: PODIATRIST

## 2020-12-23 PROCEDURE — 2580000003 HC RX 258: Performed by: ANESTHESIOLOGY

## 2020-12-23 PROCEDURE — C1713 ANCHOR/SCREW BN/BN,TIS/BN: HCPCS | Performed by: PODIATRIST

## 2020-12-23 PROCEDURE — 2500000003 HC RX 250 WO HCPCS: Performed by: ANESTHESIOLOGY

## 2020-12-23 PROCEDURE — 7100000010 HC PHASE II RECOVERY - FIRST 15 MIN: Performed by: PODIATRIST

## 2020-12-23 PROCEDURE — 3700000000 HC ANESTHESIA ATTENDED CARE: Performed by: PODIATRIST

## 2020-12-23 PROCEDURE — 7100000001 HC PACU RECOVERY - ADDTL 15 MIN: Performed by: PODIATRIST

## 2020-12-23 DEVICE — IMPLANTABLE DEVICE: Type: IMPLANTABLE DEVICE | Status: FUNCTIONAL

## 2020-12-23 RX ORDER — OXYCODONE HYDROCHLORIDE AND ACETAMINOPHEN 5; 325 MG/1; MG/1
1 TABLET ORAL PRN
Status: DISCONTINUED | OUTPATIENT
Start: 2020-12-23 | End: 2020-12-23 | Stop reason: HOSPADM

## 2020-12-23 RX ORDER — OXYCODONE HYDROCHLORIDE AND ACETAMINOPHEN 5; 325 MG/1; MG/1
2 TABLET ORAL PRN
Status: DISCONTINUED | OUTPATIENT
Start: 2020-12-23 | End: 2020-12-23 | Stop reason: HOSPADM

## 2020-12-23 RX ORDER — SODIUM CHLORIDE 0.9 % (FLUSH) 0.9 %
10 SYRINGE (ML) INJECTION EVERY 12 HOURS SCHEDULED
Status: DISCONTINUED | OUTPATIENT
Start: 2020-12-23 | End: 2020-12-23 | Stop reason: HOSPADM

## 2020-12-23 RX ORDER — FENTANYL CITRATE 50 UG/ML
INJECTION, SOLUTION INTRAMUSCULAR; INTRAVENOUS PRN
Status: DISCONTINUED | OUTPATIENT
Start: 2020-12-23 | End: 2020-12-23 | Stop reason: SDUPTHER

## 2020-12-23 RX ORDER — HYDRALAZINE HYDROCHLORIDE 20 MG/ML
5 INJECTION INTRAMUSCULAR; INTRAVENOUS EVERY 10 MIN PRN
Status: DISCONTINUED | OUTPATIENT
Start: 2020-12-23 | End: 2020-12-23 | Stop reason: HOSPADM

## 2020-12-23 RX ORDER — MORPHINE SULFATE 2 MG/ML
2 INJECTION, SOLUTION INTRAMUSCULAR; INTRAVENOUS EVERY 5 MIN PRN
Status: DISCONTINUED | OUTPATIENT
Start: 2020-12-23 | End: 2020-12-23 | Stop reason: HOSPADM

## 2020-12-23 RX ORDER — DEXAMETHASONE SODIUM PHOSPHATE 4 MG/ML
INJECTION, SOLUTION INTRA-ARTICULAR; INTRALESIONAL; INTRAMUSCULAR; INTRAVENOUS; SOFT TISSUE PRN
Status: DISCONTINUED | OUTPATIENT
Start: 2020-12-23 | End: 2020-12-23 | Stop reason: SDUPTHER

## 2020-12-23 RX ORDER — LABETALOL HYDROCHLORIDE 5 MG/ML
5 INJECTION, SOLUTION INTRAVENOUS EVERY 10 MIN PRN
Status: DISCONTINUED | OUTPATIENT
Start: 2020-12-23 | End: 2020-12-23 | Stop reason: HOSPADM

## 2020-12-23 RX ORDER — BUPIVACAINE HYDROCHLORIDE 5 MG/ML
INJECTION, SOLUTION EPIDURAL; INTRACAUDAL PRN
Status: DISCONTINUED | OUTPATIENT
Start: 2020-12-23 | End: 2020-12-23 | Stop reason: ALTCHOICE

## 2020-12-23 RX ORDER — SODIUM CHLORIDE, SODIUM LACTATE, POTASSIUM CHLORIDE, CALCIUM CHLORIDE 600; 310; 30; 20 MG/100ML; MG/100ML; MG/100ML; MG/100ML
INJECTION, SOLUTION INTRAVENOUS CONTINUOUS
Status: DISCONTINUED | OUTPATIENT
Start: 2020-12-23 | End: 2020-12-23 | Stop reason: HOSPADM

## 2020-12-23 RX ORDER — PROMETHAZINE HYDROCHLORIDE 25 MG/ML
6.25 INJECTION, SOLUTION INTRAMUSCULAR; INTRAVENOUS
Status: DISCONTINUED | OUTPATIENT
Start: 2020-12-23 | End: 2020-12-23 | Stop reason: HOSPADM

## 2020-12-23 RX ORDER — ACETAMINOPHEN 500 MG
1000 TABLET ORAL ONCE
Status: COMPLETED | OUTPATIENT
Start: 2020-12-23 | End: 2020-12-23

## 2020-12-23 RX ORDER — MORPHINE SULFATE 2 MG/ML
1 INJECTION, SOLUTION INTRAMUSCULAR; INTRAVENOUS EVERY 5 MIN PRN
Status: DISCONTINUED | OUTPATIENT
Start: 2020-12-23 | End: 2020-12-23 | Stop reason: HOSPADM

## 2020-12-23 RX ORDER — ACETAMINOPHEN 500 MG
TABLET ORAL
Status: DISCONTINUED
Start: 2020-12-23 | End: 2020-12-23 | Stop reason: HOSPADM

## 2020-12-23 RX ORDER — SODIUM CHLORIDE 0.9 % (FLUSH) 0.9 %
10 SYRINGE (ML) INJECTION PRN
Status: DISCONTINUED | OUTPATIENT
Start: 2020-12-23 | End: 2020-12-23 | Stop reason: HOSPADM

## 2020-12-23 RX ORDER — ONDANSETRON 2 MG/ML
4 INJECTION INTRAMUSCULAR; INTRAVENOUS PRN
Status: DISCONTINUED | OUTPATIENT
Start: 2020-12-23 | End: 2020-12-23 | Stop reason: HOSPADM

## 2020-12-23 RX ORDER — ONDANSETRON 2 MG/ML
INJECTION INTRAMUSCULAR; INTRAVENOUS PRN
Status: DISCONTINUED | OUTPATIENT
Start: 2020-12-23 | End: 2020-12-23 | Stop reason: SDUPTHER

## 2020-12-23 RX ORDER — LIDOCAINE HYDROCHLORIDE 10 MG/ML
0.3 INJECTION, SOLUTION EPIDURAL; INFILTRATION; INTRACAUDAL; PERINEURAL
Status: COMPLETED | OUTPATIENT
Start: 2020-12-23 | End: 2020-12-23

## 2020-12-23 RX ORDER — LIDOCAINE HYDROCHLORIDE 20 MG/ML
INJECTION, SOLUTION EPIDURAL; INFILTRATION; INTRACAUDAL; PERINEURAL PRN
Status: DISCONTINUED | OUTPATIENT
Start: 2020-12-23 | End: 2020-12-23 | Stop reason: SDUPTHER

## 2020-12-23 RX ORDER — DIPHENHYDRAMINE HYDROCHLORIDE 50 MG/ML
12.5 INJECTION INTRAMUSCULAR; INTRAVENOUS
Status: DISCONTINUED | OUTPATIENT
Start: 2020-12-23 | End: 2020-12-23 | Stop reason: HOSPADM

## 2020-12-23 RX ORDER — PROPOFOL 10 MG/ML
INJECTION, EMULSION INTRAVENOUS PRN
Status: DISCONTINUED | OUTPATIENT
Start: 2020-12-23 | End: 2020-12-23 | Stop reason: SDUPTHER

## 2020-12-23 RX ADMIN — Medication 2 G: at 08:40

## 2020-12-23 RX ADMIN — FENTANYL CITRATE 50 MCG: 50 INJECTION INTRAMUSCULAR; INTRAVENOUS at 09:43

## 2020-12-23 RX ADMIN — FENTANYL CITRATE 50 MCG: 50 INJECTION INTRAMUSCULAR; INTRAVENOUS at 09:13

## 2020-12-23 RX ADMIN — LIDOCAINE HYDROCHLORIDE 100 MG: 20 INJECTION, SOLUTION EPIDURAL; INFILTRATION; INTRACAUDAL; PERINEURAL at 08:50

## 2020-12-23 RX ADMIN — FENTANYL CITRATE 100 MCG: 50 INJECTION INTRAMUSCULAR; INTRAVENOUS at 08:50

## 2020-12-23 RX ADMIN — DEXAMETHASONE SODIUM PHOSPHATE 8 MG: 4 INJECTION, SOLUTION INTRAMUSCULAR; INTRAVENOUS at 08:56

## 2020-12-23 RX ADMIN — SODIUM CHLORIDE, POTASSIUM CHLORIDE, SODIUM LACTATE AND CALCIUM CHLORIDE: 600; 310; 30; 20 INJECTION, SOLUTION INTRAVENOUS at 07:27

## 2020-12-23 RX ADMIN — PROPOFOL 400 MG: 10 INJECTION, EMULSION INTRAVENOUS at 08:50

## 2020-12-23 RX ADMIN — LIDOCAINE HYDROCHLORIDE 0.1 ML: 10 INJECTION, SOLUTION EPIDURAL; INFILTRATION; INTRACAUDAL; PERINEURAL at 07:27

## 2020-12-23 RX ADMIN — ONDANSETRON 4 MG: 2 INJECTION, SOLUTION INTRAMUSCULAR; INTRAVENOUS at 08:56

## 2020-12-23 RX ADMIN — ACETAMINOPHEN 1000 MG: 500 TABLET, FILM COATED ORAL at 07:17

## 2020-12-23 ASSESSMENT — PULMONARY FUNCTION TESTS
PIF_VALUE: 12
PIF_VALUE: 11
PIF_VALUE: 6
PIF_VALUE: 5
PIF_VALUE: 11
PIF_VALUE: 22
PIF_VALUE: 5
PIF_VALUE: 6
PIF_VALUE: 10
PIF_VALUE: 5
PIF_VALUE: 2
PIF_VALUE: 4
PIF_VALUE: 11
PIF_VALUE: 3
PIF_VALUE: 2
PIF_VALUE: 4
PIF_VALUE: 2
PIF_VALUE: 5
PIF_VALUE: 11
PIF_VALUE: 11
PIF_VALUE: 17
PIF_VALUE: 1
PIF_VALUE: 3
PIF_VALUE: 2
PIF_VALUE: 9
PIF_VALUE: 6
PIF_VALUE: 11
PIF_VALUE: 6
PIF_VALUE: 14
PIF_VALUE: 10
PIF_VALUE: 11
PIF_VALUE: 4
PIF_VALUE: 15
PIF_VALUE: 15
PIF_VALUE: 1
PIF_VALUE: 11
PIF_VALUE: 5
PIF_VALUE: 13
PIF_VALUE: 5
PIF_VALUE: 3
PIF_VALUE: 5
PIF_VALUE: 4
PIF_VALUE: 11
PIF_VALUE: 4
PIF_VALUE: 6
PIF_VALUE: 5
PIF_VALUE: 4
PIF_VALUE: 11
PIF_VALUE: 4
PIF_VALUE: 10
PIF_VALUE: 4
PIF_VALUE: 1
PIF_VALUE: 4
PIF_VALUE: 4
PIF_VALUE: 5
PIF_VALUE: 2
PIF_VALUE: 11
PIF_VALUE: 11
PIF_VALUE: 6
PIF_VALUE: 4
PIF_VALUE: 3
PIF_VALUE: 3
PIF_VALUE: 5
PIF_VALUE: 15
PIF_VALUE: 4
PIF_VALUE: 5
PIF_VALUE: 4
PIF_VALUE: 11
PIF_VALUE: 11
PIF_VALUE: 10
PIF_VALUE: 4
PIF_VALUE: 3
PIF_VALUE: 11
PIF_VALUE: 4
PIF_VALUE: 11
PIF_VALUE: 2
PIF_VALUE: 4
PIF_VALUE: 16
PIF_VALUE: 3
PIF_VALUE: 11
PIF_VALUE: 1
PIF_VALUE: 5

## 2020-12-23 ASSESSMENT — PAIN DESCRIPTION - DESCRIPTORS: DESCRIPTORS: ACHING;CONSTANT

## 2020-12-23 ASSESSMENT — PAIN SCALES - GENERAL
PAINLEVEL_OUTOF10: 0
PAINLEVEL_OUTOF10: 4
PAINLEVEL_OUTOF10: 0

## 2020-12-23 ASSESSMENT — PAIN - FUNCTIONAL ASSESSMENT
PAIN_FUNCTIONAL_ASSESSMENT: PREVENTS OR INTERFERES SOME ACTIVE ACTIVITIES AND ADLS
PAIN_FUNCTIONAL_ASSESSMENT: 0-10

## 2020-12-23 NOTE — OP NOTE
Operative Note      Patient: Mayra Marquez  YOB: 1990  MRN: [id number]    Date of Procedure: 12/23/2020    Pre-Op Diagnosis: Displacement of internal fixation device of bone of left lower leg, subsequent encounter    Post-Op Diagnosis: Same       Procedure(s):  REPAIR OF MALUNION LEFT FOOT    Surgeon(s):  Yadi Farias DPM    Assistant:   Surgical Assistant: Maxine Hudson    Anesthesia: General    Estimated Blood Loss (mL): less than 843     Complications: None    Specimens:   * No specimens in log *    Implants:  * No implants in log *      Drains: * No LDAs found *    Findings: Talus noted to be small with osteopenia    Detailed Description of Procedure:   Dictation #:  [id number]    Electronically signed by Yadi Farias DPM on 12/23/2020 at 10:15 AM

## 2020-12-23 NOTE — PROGRESS NOTES
Pt arrived from OR, report received, asleep from anesthesia, at bedside to monitor, vitals stable, patient denies pain

## 2020-12-23 NOTE — OP NOTE
[address]                 [address] [address]                                OPERATIVE REPORT    PATIENT NAME: Garth Benson                      :        1990  MED REC NO:   [id number]                          ROOM:  ACCOUNT NO:   [de-identified]                           ADMIT DATE: 2020  PROVIDER:     Cheryl Schwarz DPM    DATE OF PROCEDURE:  2020    PREOPERATIVE DIAGNOSIS:  Displacement of hardware with malalignment of  fusion site. POSTOPERATIVE DIAGNOSIS:  Displacement of hardware with malalignment of  fusion site. PROCEDURE PERFORMED:  Repair of malunion, left foot. SURGEON:  Cheryl Schwarz DPM    ASSISTANT:  Niraj Barnett. EBL: less than 50mL    MATERIALS USED:  Synthes 5-0, 60 mm long screw. OPERATIVE PROCEDURE:  The patient was brought to the operating room,  placed on the operating table in supine position. The patient was then  placed under general anesthesia. The left foot and ankle was scrubbed  and draped in usual sterile aseptic manner. An Esmarch bandage was used  to exsanguinate the left foot and ankle and a well-padded thigh  tourniquet was inflated to 350 mmHg. Attention was now directed at the  dorsal medial aspect of the left foot and ankle. A new incision was  placed over the talar neck area, prior incisions were not used due to  low quality of tissue noted in the area. The location of the screw was  identified on the C-arm and a 1.5 cm incision was placed over this area. Careful sharp and dull dissection was carried down deep through the skin  and subcutaneous tissue. The screw placement was identified and a  K-wire was inserted in this cannulated screw. The surrounding bone had  poor quality with easy breaking of bone noted throughout the talar neck  area. The screw was removed and K-wire was left for temporary fixation. Lateral aspect of the joint was evaluated with a 2-cm incision over this  area. Careful sharp and dull dissection was carried down deep through  the skin and subcutaneous tissue. Again, poor quality of bone was also  noted in this area. The subtalar implant was noted to be displaced in  the lateral gutter and it was decided to completely remove this. The  subtalar joint was then fixated with a 5-0 screw. Good fixation and  compression of the joint was noted. Area was evaluated and  consideration for another point of fixation was made. There was no  space on the talar neck for an additional screw and due to the soft  nature of the bone, also decision was made not to insert another screw  in the small portion of the talar neck or medially or laterally, there  was a concern that further compromise of the talus may compromise the  ankle joint. The wounds were irrigated with copious amount of normal  saline and subcutaneous tissue was reapproximated using 3-0 Vicryl  sutures and 4-0 Vicryl sutures were used to reapproximate the skin edges  in a continuous subcuticular manner. Next, 20 mL of 0.5% Marcaine was  injected for postoperative anesthesia and compression Dietz dressings  were applied to the left foot, ankle and leg followed by posterior  splint. Thigh tourniquet was deflated at this time and circulation was  noted to return to left foot almost instantaneously. The patient  tolerated anesthesia and procedure well and left the operating room with  vital signs stable and neurovascular status intact.         Selena Keita DPM    D: 12/23/2020 10:21:53       T: 12/23/2020 12:51:42     [email or web address]  Job#: [id number]     Doc#: [id number]    CC:

## 2020-12-23 NOTE — ANESTHESIA POSTPROCEDURE EVALUATION
Department of Anesthesiology  Postprocedure Note    Patient: Leandra William  MRN: [id number]  YOB: 1990  Date of evaluation: 12/23/2020  Time:  2:18 PM     Procedure Summary     Date: 12/23/20 Room / Location: [facility] EG OR 03 / [facility]    Anesthesia Start: 1885 Anesthesia Stop: 6792    Procedure: REPAIR OF MALUNION LEFT FOOT (Left Foot) Diagnosis:       Displacement of internal fixation device of bone of left lower leg, subsequent encounter      (Displacement of internal fixation device of bone of left lower leg, subsequent encounter)    Surgeons: Chely Aly DPM Responsible Provider: Jennifer Burkett MD    Anesthesia Type: general ASA Status: 3          Anesthesia Type: general    Gamaliel Phase I: Gamaliel Score: 10    Gamaliel Phase II: Gamaliel Score: 10    Last vitals: Reviewed and per EMR flowsheets.        Anesthesia Post Evaluation    Comments: Postoperative Anesthesia Note    Name:    Leandra William  MRN:      [id number]    Patient Vitals in the past 12 hrs:  12/23/20 1045, BP:132/82, Pulse:86, Resp:15, SpO2:98 %  12/23/20 1030, BP:116/77, Temp:97.5 °F (36.4 °C), Temp src:Temporal, Pulse:85, Resp:16, SpO2:98 %  12/23/20 1024, BP:125/77, Pulse:89, Resp:15, SpO2:97 %  12/23/20 1019, BP:123/75, Pulse:88, Resp:16, SpO2:97 %  12/23/20 1014, BP:118/74, Temp:97.1 °F (36.2 °C), Temp src:Temporal, Pulse:84, Resp:15, SpO2:97 %  12/23/20 0707, BP:(!) 138/91, Temp:97 °F (36.1 °C), Pulse:90, Resp:19, SpO2:100 %  12/23/20 0700, Temp src:Temporal     LABS:    CBC  Lab Results       Component                Value               Date/Time                  WBC                      11.0                10/26/2019 01:20 PM        HGB                      13.9                10/26/2019 01:20 PM        HCT                      41.8                10/26/2019 01:20 PM        PLT                      216                 10/26/2019 01:20 PM   RENAL  Lab Results Component                Value               Date/Time                  NA                       137                 10/26/2019 01:20 PM        K                        4.0                 10/26/2019 01:20 PM        CL                       102                 10/26/2019 01:20 PM        CO2                      24                  10/26/2019 01:20 PM        BUN                      15                  10/26/2019 01:20 PM        CREATININE               0.7                 10/26/2019 01:20 PM        GLUCOSE                  80                  10/26/2019 01:20 PM   COAGS  No results found for: PROTIME, INR, APTT    Intake & Output:  @24HRIO@    Nausea & Vomiting:  No    Level of Consciousness:  Awake    Pain Assessment:  Adequate analgesia    Anesthesia Complications:  No apparent anesthetic complications    SUMMARY      Vital signs stable  OK to discharge from Stage I post anesthesia care.   Care transferred from Anesthesiology department on discharge from perioperative area

## 2020-12-23 NOTE — H&P
Department of Podiatry          CHIEF COMPLAINT:  Subluxation of the STJ fusion site. HISTORY OF PRESENT ILLNESS:                The patient is a 26 y.o. female with recent STJ fusion with malposition and incomplete union. Past Medical History:        Diagnosis Date    Anxiety     Asthma     Depression     Kidney stone     Vitamin D deficiency      Past Surgical History:        Procedure Laterality Date    ARTHRODESIS Left 11/4/2020    SUBTALAR JOINT FUSION, CALCANEO-NAVICULAR COALITION RESECTION LEFT FOOT performed by Zoila Ochoa DPM at [address] [facility]       Current Medications:    Current Facility-Administered Medications: lactated ringers infusion, , Intravenous, Continuous  sodium chloride flush 0.9 % injection 10 mL, 10 mL, Intravenous, 2 times per day  sodium chloride flush 0.9 % injection 10 mL, 10 mL, Intravenous, PRN  acetaminophen (TYLENOL) 500 MG tablet, , ,   meperidine (DEMEROL) injection SOLN 12.5 mg, 12.5 mg, Intravenous, Q5 Min PRN  HYDROmorphone (DILAUDID) injection 0.25 mg, 0.25 mg, Intravenous, Q5 Min PRN  HYDROmorphone (DILAUDID) injection 0.5 mg, 0.5 mg, Intravenous, Q5 Min PRN  morphine (PF) injection 1 mg, 1 mg, Intravenous, Q5 Min PRN  morphine (PF) injection 2 mg, 2 mg, Intravenous, Q5 Min PRN  oxyCODONE-acetaminophen (PERCOCET) 5-325 MG per tablet 1 tablet, 1 tablet, Oral, PRN **OR** oxyCODONE-acetaminophen (PERCOCET) 5-325 MG per tablet 2 tablet, 2 tablet, Oral, PRN  ondansetron (ZOFRAN) injection 4 mg, 4 mg, Intravenous, PRN  promethazine (PHENERGAN) injection 6.25 mg, 6.25 mg, Intravenous, Q15 Min PRN  diphenhydrAMINE (BENADRYL) injection 12.5 mg, 12.5 mg, Intravenous, Once PRN  labetalol (NORMODYNE;TRANDATE) injection 5 mg, 5 mg, Intravenous, Q10 Min PRN  hydrALAZINE (APRESOLINE) injection 5 mg, 5 mg, Intravenous, Q10 Min PRN  bupivacaine (PF) (MARCAINE) 0.5 % injection, , , PRN  Allergies:  Patient has no known allergies.     Social History:    TOBACCO:   reports that she has quit smoking. Her smoking use included cigarettes. She smoked 0.25 packs per day. She has never used smokeless tobacco.  Family History:   History reviewed. No pertinent family history. REVIEW OF SYSTEMS:    WNL    Physical Exam:  BP (!) 138/91   Pulse 90   Temp 97 °F (36.1 °C)   Resp 19   Ht 5' 3\" (1.6 m)   Wt 240 lb (108.9 kg)   SpO2 100%   BMI 42.51 kg/m²   General appearance: alert, appears stated age and cooperative  Head: Normocephalic, without obvious abnormality, atraumatic  Eyes: conjunctivae/corneas clear. PERRL, EOM's intact. Neck: no adenopathy, supple  Lungs: clear to auscultation bilaterally  Heart: regular rate and rhythm, S1, S2 normal  Abdomen: soft, non-tender; bowel sounds normal; no masses,  no organomegaly  Extremities: extremities normal, atraumatic, no cyanosis or edema  Skin: Skin color, texture, turgor normal. No rashes or lesions  Neurologic: Grossly normal  SKIN:  Prior incisions are healed but some delay in healing noted. NEUROLOGIC:    Pain sensation isnot significantly changed Bilateral.  VASCULAR:    bilaterally Dorsalis pedis is 2. bilaterally Posterior tibial pulse is 2. 2+ edema left.      IMPRESSION/RECOMMENDATIONS:    - Proceed with planned revision of STJ fusion surgery

## 2020-12-23 NOTE — ANESTHESIA PRE PROCEDURE
Department of Anesthesiology  Preprocedure Note       Name:  Scarlet Wilson   Age:  26 y.o.  :  1990                                          MRN:  [id number]         Date:  2020      Surgeon: Yumi Russell):  Salena Mehta DPM    Procedure: Procedure(s):  REPAIR OF MALUNION LEFT FOOT    Medications prior to admission:   Prior to Admission medications    Medication Sig Start Date End Date Taking? Authorizing Provider   BUPROPION HCL PO Take by mouth   Yes Historical Provider, MD   ondansetron (ZOFRAN ODT) 4 MG disintegrating tablet Take 1 tablet by mouth every 8 hours as needed for Nausea or Vomiting 10/3/19  Yes Charlene Wan,    cyclobenzaprine (FLEXERIL) 10 MG tablet Take 10 mg by mouth 3 times daily as needed for Muscle spasms   Yes Historical Provider, MD   escitalopram (LEXAPRO) 20 MG tablet Take 20 mg by mouth daily   Yes Historical Provider, MD   ondansetron (ZOFRAN) 4 MG tablet Take 1 tablet by mouth daily as needed for Nausea or Vomiting 10/26/19   Delia Jolly,    beclomethasone (QVAR REDIHALER) 80 MCG/ACT AERB inhaler Inhale 1 puff into the lungs 2 times daily    Historical Provider, MD       Current medications:    Current Facility-Administered Medications   Medication Dose Route Frequency Provider Last Rate Last Admin    ceFAZolin (ANCEF) 2 g in sterile water 20 mL IV syringe  2 g Intravenous Once Glenny Pillai DPM        lactated ringers infusion   Intravenous Continuous Edson Ferguson  mL/hr at 20 0727 New Bag at 20 0727    sodium chloride flush 0.9 % injection 10 mL  10 mL Intravenous 2 times per day Edson Ferguson MD        sodium chloride flush 0.9 % injection 10 mL  10 mL Intravenous PRN Edson Ferguson MD        acetaminophen (TYLENOL) 500 MG tablet                Allergies:  No Known Allergies    Problem List:  There is no problem list on file for this patient.       Past Medical History:        Diagnosis Date    Anxiety     Asthma  Depression     Kidney stone     Vitamin D deficiency        Past Surgical History:        Procedure Laterality Date    ARTHRODESIS Left 11/4/2020    SUBTALAR JOINT FUSION, CALCANEO-NAVICULAR COALITION RESECTION LEFT FOOT performed by Hansa Chua DPM at [address] LEEP         Social History:    Social History     Tobacco Use    Smoking status: Former Smoker     Packs/day: 0.25     Types: Cigarettes    Smokeless tobacco: Never Used   Substance Use Topics    Alcohol use: Yes     Comment: rare                                Counseling given: Not Answered      Vital Signs (Current):   Vitals:    12/21/20 1503 12/23/20 0700 12/23/20 0707   BP:   (!) 138/91   Pulse:   90   Resp:   19   Temp:   97 °F (36.1 °C)   TempSrc:  Temporal    SpO2:   100%   Weight: 240 lb (108.9 kg)     Height: 5' 3\" (1.6 m)                                                BP Readings from Last 3 Encounters:   12/23/20 (!) 138/91   11/04/20 133/62   11/04/20 125/71       NPO Status: Time of last liquid consumption: 2200                        Time of last solid consumption: 2200                        Date of last liquid consumption: 12/22/20                        Date of last solid food consumption: 12/22/20    BMI:   Wt Readings from Last 3 Encounters:   12/21/20 240 lb (108.9 kg)   11/02/20 254 lb (115.2 kg)   10/26/19 250 lb (113.4 kg)     Body mass index is 42.51 kg/m².     CBC:   Lab Results   Component Value Date    WBC 11.0 10/26/2019    RBC 5.01 10/26/2019    HGB 13.9 10/26/2019    HCT 41.8 10/26/2019    MCV 83.4 10/26/2019    RDW 14.9 10/26/2019     10/26/2019       CMP:   Lab Results   Component Value Date     10/26/2019    K 4.0 10/26/2019     10/26/2019    CO2 24 10/26/2019    BUN 15 10/26/2019    CREATININE 0.7 10/26/2019    GFRAA >60 10/26/2019    AGRATIO 1.0 10/26/2019    LABGLOM >60 10/26/2019    GLUCOSE 80 10/26/2019    PROT 8.4 10/26/2019    CALCIUM 9.6 10/26/2019    BILITOT 0.4 10/26/2019 ALKPHOS 105 10/26/2019    AST 20 10/26/2019    ALT 26 10/26/2019       POC Tests: No results for input(s): POCGLU, POCNA, POCK, POCCL, POCBUN, POCHEMO, POCHCT in the last 72 hours. Coags: No results found for: PROTIME, INR, APTT    HCG (If Applicable):   Lab Results   Component Value Date    PREGTESTUR Negative 12/23/2020        ABGs: No results found for: PHART, PO2ART, SRT0ZAT, DMG8HUB, BEART, U4MBZDZP     Type & Screen (If Applicable):  No results found for: LABABO, LABRH    Drug/Infectious Status (If Applicable):  No results found for: HIV, HEPCAB    COVID-19 Screening (If Applicable):   Lab Results   Component Value Date    COVID19 Not Detected 12/18/2020    COVID19 Not Detected 10/30/2020         Anesthesia Evaluation  Patient summary reviewed no history of anesthetic complications:   Airway: Mallampati: II  TM distance: >3 FB   Neck ROM: full  Mouth opening: > = 3 FB Dental: normal exam         Pulmonary:Negative Pulmonary ROS and normal exam  breath sounds clear to auscultation  (+) asthma:                            Cardiovascular:Negative CV ROS  Exercise tolerance: good (>4 METS),           Rhythm: regular  Rate: normal                    Neuro/Psych:   Negative Neuro/Psych ROS              GI/Hepatic/Renal: Neg GI/Hepatic/Renal ROS  (+) morbid obesity          Endo/Other: Negative Endo/Other ROS                    Abdominal:           Vascular: negative vascular ROS. Pre-Operative Diagnosis: Displacement of internal fixation device of bone of left lower leg, subsequent encounter [T84.127D]    26 y.o.   BMI:  Body mass index is 42.51 kg/m².      Vitals:    12/21/20 1503 12/23/20 0700 12/23/20 0707   BP:   (!) 138/91   Pulse:   90   Resp:   19   Temp:   97 °F (36.1 °C)   TempSrc:  Temporal    SpO2:   100%   Weight: 240 lb (108.9 kg)     Height: 5' 3\" (1.6 m)         No Known Allergies    Social History     Tobacco Use    Smoking status: Former Smoker Packs/day: 0.25     Types: Cigarettes    Smokeless tobacco: Never Used   Substance Use Topics    Alcohol use: Yes     Comment: rare       LABS:    CBC  Lab Results   Component Value Date/Time    WBC 11.0 10/26/2019 01:20 PM    HGB 13.9 10/26/2019 01:20 PM    HCT 41.8 10/26/2019 01:20 PM     10/26/2019 01:20 PM     RENAL  Lab Results   Component Value Date/Time     10/26/2019 01:20 PM    K 4.0 10/26/2019 01:20 PM     10/26/2019 01:20 PM    CO2 24 10/26/2019 01:20 PM    BUN 15 10/26/2019 01:20 PM    CREATININE 0.7 10/26/2019 01:20 PM    GLUCOSE 80 10/26/2019 01:20 PM     COAGS  No results found for: PROTIME, INR, APTT          Anesthesia Plan      general     ASA 3     (I discussed with the patient the risks and benefits of PIV, anesthesia, IV Narcotics, PACU. All questions were answered the patient agrees with the plan and wishes to proceed)  Induction: intravenous.                         Jong Hand MD   12/23/2020

## 2021-01-02 LAB — SARS-COV-2: DETECTED

## 2021-01-13 ENCOUNTER — APPOINTMENT (OUTPATIENT)
Dept: CT IMAGING | Age: 31
DRG: 871 | End: 2021-01-13
Payer: COMMERCIAL

## 2021-01-13 ENCOUNTER — HOSPITAL ENCOUNTER (INPATIENT)
Age: 31
LOS: 4 days | Discharge: HOME | DRG: 871 | End: 2021-01-17
Attending: EMERGENCY MEDICINE | Admitting: HOSPITALIST
Payer: COMMERCIAL

## 2021-01-13 ENCOUNTER — APPOINTMENT (OUTPATIENT)
Dept: GENERAL RADIOLOGY | Age: 31
DRG: 871 | End: 2021-01-13
Payer: COMMERCIAL

## 2021-01-13 DIAGNOSIS — I26.94 MULTIPLE SUBSEGMENTAL PULMONARY EMBOLI WITHOUT ACUTE COR PULMONALE (HCC): Primary | ICD-10-CM

## 2021-01-13 DIAGNOSIS — U07.1 COVID-19: ICD-10-CM

## 2021-01-13 PROBLEM — I26.99 ACUTE PULMONARY EMBOLISM (HCC): Status: ACTIVE | Noted: 2021-01-13

## 2021-01-13 LAB
A/G RATIO: 1 (ref 1.1–2.2)
ALBUMIN SERPL-MCNC: 4.1 G/DL (ref 3.4–5)
ALP BLD-CCNC: 135 U/L (ref 40–129)
ALT SERPL-CCNC: 47 U/L (ref 10–40)
ANION GAP SERPL CALCULATED.3IONS-SCNC: 11 MMOL/L (ref 3–16)
AST SERPL-CCNC: 16 U/L (ref 15–37)
BACTERIA: ABNORMAL /HPF
BASOPHILS ABSOLUTE: 0.1 K/UL (ref 0–0.2)
BASOPHILS RELATIVE PERCENT: 0.6 %
BILIRUB SERPL-MCNC: 0.8 MG/DL (ref 0–1)
BILIRUBIN URINE: ABNORMAL
BLOOD, URINE: ABNORMAL
BUN BLDV-MCNC: 11 MG/DL (ref 7–20)
CALCIUM SERPL-MCNC: 9.8 MG/DL (ref 8.3–10.6)
CHLORIDE BLD-SCNC: 104 MMOL/L (ref 99–110)
CLARITY: ABNORMAL
CO2: 23 MMOL/L (ref 21–32)
COLOR: YELLOW
CREAT SERPL-MCNC: 0.8 MG/DL (ref 0.6–1.1)
EOSINOPHILS ABSOLUTE: 0.1 K/UL (ref 0–0.6)
EOSINOPHILS RELATIVE PERCENT: 0.6 %
EPITHELIAL CELLS, UA: ABNORMAL /HPF (ref 0–5)
GFR AFRICAN AMERICAN: >60
GFR NON-AFRICAN AMERICAN: >60
GLOBULIN: 4.3 G/DL
GLUCOSE BLD-MCNC: 103 MG/DL (ref 70–99)
GLUCOSE URINE: NEGATIVE MG/DL
HCG QUALITATIVE: NEGATIVE
HCT VFR BLD CALC: 42.3 % (ref 36–48)
HEMOGLOBIN: 13.9 G/DL (ref 12–16)
KETONES, URINE: NEGATIVE MG/DL
LEUKOCYTE ESTERASE, URINE: NEGATIVE
LYMPHOCYTES ABSOLUTE: 2.4 K/UL (ref 1–5.1)
LYMPHOCYTES RELATIVE PERCENT: 16.7 %
MCH RBC QN AUTO: 26.6 PG (ref 26–34)
MCHC RBC AUTO-ENTMCNC: 33 G/DL (ref 31–36)
MCV RBC AUTO: 80.7 FL (ref 80–100)
MICROSCOPIC EXAMINATION: YES
MONOCYTES ABSOLUTE: 1 K/UL (ref 0–1.3)
MONOCYTES RELATIVE PERCENT: 6.9 %
NEUTROPHILS ABSOLUTE: 11 K/UL (ref 1.7–7.7)
NEUTROPHILS RELATIVE PERCENT: 75.2 %
NITRITE, URINE: NEGATIVE
PDW BLD-RTO: 14.4 % (ref 12.4–15.4)
PH UA: 5.5 (ref 5–8)
PLATELET # BLD: 338 K/UL (ref 135–450)
PMV BLD AUTO: 8 FL (ref 5–10.5)
POTASSIUM SERPL-SCNC: 3.8 MMOL/L (ref 3.5–5.1)
PROTEIN UA: 30 MG/DL
RBC # BLD: 5.24 M/UL (ref 4–5.2)
RBC UA: ABNORMAL /HPF (ref 0–4)
SODIUM BLD-SCNC: 138 MMOL/L (ref 136–145)
SPECIFIC GRAVITY UA: >=1.03 (ref 1–1.03)
TOTAL PROTEIN: 8.4 G/DL (ref 6.4–8.2)
TROPONIN: <0.01 NG/ML
URINE TYPE: ABNORMAL
UROBILINOGEN, URINE: 0.2 E.U./DL
WBC # BLD: 14.7 K/UL (ref 4–11)
WBC UA: ABNORMAL /HPF (ref 0–5)

## 2021-01-13 PROCEDURE — 36415 COLL VENOUS BLD VENIPUNCTURE: CPT

## 2021-01-13 PROCEDURE — 99285 EMERGENCY DEPT VISIT HI MDM: CPT

## 2021-01-13 PROCEDURE — 2060000000 HC ICU INTERMEDIATE R&B

## 2021-01-13 PROCEDURE — 71045 X-RAY EXAM CHEST 1 VIEW: CPT

## 2021-01-13 PROCEDURE — 85025 COMPLETE CBC W/AUTO DIFF WBC: CPT

## 2021-01-13 PROCEDURE — 87086 URINE CULTURE/COLONY COUNT: CPT

## 2021-01-13 PROCEDURE — 96372 THER/PROPH/DIAG INJ SC/IM: CPT

## 2021-01-13 PROCEDURE — 84484 ASSAY OF TROPONIN QUANT: CPT

## 2021-01-13 PROCEDURE — 81001 URINALYSIS AUTO W/SCOPE: CPT

## 2021-01-13 PROCEDURE — 96374 THER/PROPH/DIAG INJ IV PUSH: CPT

## 2021-01-13 PROCEDURE — 6360000002 HC RX W HCPCS: Performed by: EMERGENCY MEDICINE

## 2021-01-13 PROCEDURE — 96376 TX/PRO/DX INJ SAME DRUG ADON: CPT

## 2021-01-13 PROCEDURE — 84703 CHORIONIC GONADOTROPIN ASSAY: CPT

## 2021-01-13 PROCEDURE — 6360000004 HC RX CONTRAST MEDICATION: Performed by: EMERGENCY MEDICINE

## 2021-01-13 PROCEDURE — 80053 COMPREHEN METABOLIC PANEL: CPT

## 2021-01-13 PROCEDURE — 71260 CT THORAX DX C+: CPT

## 2021-01-13 PROCEDURE — 93005 ELECTROCARDIOGRAM TRACING: CPT | Performed by: EMERGENCY MEDICINE

## 2021-01-13 PROCEDURE — 96375 TX/PRO/DX INJ NEW DRUG ADDON: CPT

## 2021-01-13 RX ORDER — MORPHINE SULFATE 4 MG/ML
4 INJECTION, SOLUTION INTRAMUSCULAR; INTRAVENOUS ONCE
Status: COMPLETED | OUTPATIENT
Start: 2021-01-13 | End: 2021-01-13

## 2021-01-13 RX ORDER — ONDANSETRON 2 MG/ML
4 INJECTION INTRAMUSCULAR; INTRAVENOUS ONCE
Status: COMPLETED | OUTPATIENT
Start: 2021-01-13 | End: 2021-01-13

## 2021-01-13 RX ORDER — OXYCODONE HYDROCHLORIDE AND ACETAMINOPHEN 5; 325 MG/1; MG/1
1 TABLET ORAL EVERY 4 HOURS PRN
Status: ON HOLD | COMMUNITY
End: 2021-01-16 | Stop reason: SDUPTHER

## 2021-01-13 RX ORDER — KETOROLAC TROMETHAMINE 30 MG/ML
15 INJECTION, SOLUTION INTRAMUSCULAR; INTRAVENOUS ONCE
Status: COMPLETED | OUTPATIENT
Start: 2021-01-13 | End: 2021-01-13

## 2021-01-13 RX ADMIN — MORPHINE SULFATE 4 MG: 4 INJECTION INTRAVENOUS at 20:41

## 2021-01-13 RX ADMIN — ONDANSETRON HYDROCHLORIDE 4 MG: 2 INJECTION, SOLUTION INTRAMUSCULAR; INTRAVENOUS at 20:37

## 2021-01-13 RX ADMIN — KETOROLAC TROMETHAMINE 15 MG: 30 INJECTION, SOLUTION INTRAMUSCULAR at 20:39

## 2021-01-13 RX ADMIN — ENOXAPARIN SODIUM 105 MG: 150 INJECTION SUBCUTANEOUS at 23:04

## 2021-01-13 RX ADMIN — MORPHINE SULFATE 4 MG: 4 INJECTION INTRAVENOUS at 23:01

## 2021-01-13 RX ADMIN — IOPAMIDOL 75 ML: 755 INJECTION, SOLUTION INTRAVENOUS at 21:46

## 2021-01-13 ASSESSMENT — PAIN SCALES - GENERAL
PAINLEVEL_OUTOF10: 4
PAINLEVEL_OUTOF10: 9

## 2021-01-13 ASSESSMENT — PAIN DESCRIPTION - LOCATION
LOCATION: RIB CAGE
LOCATION: RIB CAGE

## 2021-01-13 ASSESSMENT — PAIN DESCRIPTION - PAIN TYPE
TYPE: ACUTE PAIN
TYPE: ACUTE PAIN

## 2021-01-13 NOTE — ED TRIAGE NOTES
Presents per [facility] for c/o right posterior \"rib\" pain since this a.m. States she has had increased coughing since her covid 23 diagnosis on Dec 29, 2020. Cough is nonproductive.

## 2021-01-14 PROBLEM — I26.94 MULTIPLE SUBSEGMENTAL PULMONARY EMBOLI WITHOUT ACUTE COR PULMONALE (HCC): Status: ACTIVE | Noted: 2021-01-13

## 2021-01-14 LAB
ANION GAP SERPL CALCULATED.3IONS-SCNC: 13 MMOL/L (ref 3–16)
APTT: 37.3 SEC (ref 24.2–36.2)
BASOPHILS ABSOLUTE: 0.1 K/UL (ref 0–0.2)
BASOPHILS RELATIVE PERCENT: 0.5 %
BUN BLDV-MCNC: 11 MG/DL (ref 7–20)
CALCIUM SERPL-MCNC: 9.1 MG/DL (ref 8.3–10.6)
CHLORIDE BLD-SCNC: 100 MMOL/L (ref 99–110)
CO2: 22 MMOL/L (ref 21–32)
CREAT SERPL-MCNC: 0.8 MG/DL (ref 0.6–1.1)
EKG ATRIAL RATE: 99 BPM
EKG DIAGNOSIS: NORMAL
EKG P AXIS: 18 DEGREES
EKG P-R INTERVAL: 144 MS
EKG Q-T INTERVAL: 358 MS
EKG QRS DURATION: 96 MS
EKG QTC CALCULATION (BAZETT): 459 MS
EKG R AXIS: 33 DEGREES
EKG T AXIS: 13 DEGREES
EKG VENTRICULAR RATE: 99 BPM
EOSINOPHILS ABSOLUTE: 0.1 K/UL (ref 0–0.6)
EOSINOPHILS RELATIVE PERCENT: 0.6 %
GFR AFRICAN AMERICAN: >60
GFR NON-AFRICAN AMERICAN: >60
GLUCOSE BLD-MCNC: 106 MG/DL (ref 70–99)
HCT VFR BLD CALC: 39.2 % (ref 36–48)
HCT VFR BLD CALC: 42 % (ref 36–48)
HEMOGLOBIN: 12.8 G/DL (ref 12–16)
HEMOGLOBIN: 13.4 G/DL (ref 12–16)
LYMPHOCYTES ABSOLUTE: 2.7 K/UL (ref 1–5.1)
LYMPHOCYTES RELATIVE PERCENT: 18.5 %
MCH RBC QN AUTO: 26.7 PG (ref 26–34)
MCHC RBC AUTO-ENTMCNC: 32.6 G/DL (ref 31–36)
MCV RBC AUTO: 82 FL (ref 80–100)
MONOCYTES ABSOLUTE: 1.3 K/UL (ref 0–1.3)
MONOCYTES RELATIVE PERCENT: 9.1 %
NEUTROPHILS ABSOLUTE: 10.3 K/UL (ref 1.7–7.7)
NEUTROPHILS RELATIVE PERCENT: 71.3 %
PDW BLD-RTO: 14.7 % (ref 12.4–15.4)
PLATELET # BLD: 307 K/UL (ref 135–450)
PMV BLD AUTO: 8.1 FL (ref 5–10.5)
POTASSIUM REFLEX MAGNESIUM: 3.6 MMOL/L (ref 3.5–5.1)
PROCALCITONIN: 0.09 NG/ML (ref 0–0.15)
RBC # BLD: 4.79 M/UL (ref 4–5.2)
SODIUM BLD-SCNC: 135 MMOL/L (ref 136–145)
WBC # BLD: 14.4 K/UL (ref 4–11)

## 2021-01-14 PROCEDURE — 6360000002 HC RX W HCPCS: Performed by: HOSPITALIST

## 2021-01-14 PROCEDURE — 99222 1ST HOSP IP/OBS MODERATE 55: CPT | Performed by: INTERNAL MEDICINE

## 2021-01-14 PROCEDURE — 6360000002 HC RX W HCPCS: Performed by: INTERNAL MEDICINE

## 2021-01-14 PROCEDURE — 51798 US URINE CAPACITY MEASURE: CPT

## 2021-01-14 PROCEDURE — 2060000000 HC ICU INTERMEDIATE R&B

## 2021-01-14 PROCEDURE — 80048 BASIC METABOLIC PNL TOTAL CA: CPT

## 2021-01-14 PROCEDURE — 85025 COMPLETE CBC W/AUTO DIFF WBC: CPT

## 2021-01-14 PROCEDURE — 94640 AIRWAY INHALATION TREATMENT: CPT

## 2021-01-14 PROCEDURE — 2580000003 HC RX 258: Performed by: HOSPITALIST

## 2021-01-14 PROCEDURE — 93010 ELECTROCARDIOGRAM REPORT: CPT | Performed by: INTERNAL MEDICINE

## 2021-01-14 PROCEDURE — 6370000000 HC RX 637 (ALT 250 FOR IP): Performed by: HOSPITALIST

## 2021-01-14 PROCEDURE — 6360000002 HC RX W HCPCS: Performed by: EMERGENCY MEDICINE

## 2021-01-14 PROCEDURE — 84145 PROCALCITONIN (PCT): CPT

## 2021-01-14 PROCEDURE — 85018 HEMOGLOBIN: CPT

## 2021-01-14 PROCEDURE — 36415 COLL VENOUS BLD VENIPUNCTURE: CPT

## 2021-01-14 PROCEDURE — 85730 THROMBOPLASTIN TIME PARTIAL: CPT

## 2021-01-14 PROCEDURE — 85014 HEMATOCRIT: CPT

## 2021-01-14 PROCEDURE — 99222 1ST HOSP IP/OBS MODERATE 55: CPT | Performed by: PHYSICIAN ASSISTANT

## 2021-01-14 RX ORDER — HEPARIN SODIUM 1000 [USP'U]/ML
80 INJECTION, SOLUTION INTRAVENOUS; SUBCUTANEOUS ONCE
Status: COMPLETED | OUTPATIENT
Start: 2021-01-14 | End: 2021-01-14

## 2021-01-14 RX ORDER — PROMETHAZINE HYDROCHLORIDE 25 MG/1
12.5 TABLET ORAL EVERY 6 HOURS PRN
Status: DISCONTINUED | OUTPATIENT
Start: 2021-01-14 | End: 2021-01-17 | Stop reason: HOSPADM

## 2021-01-14 RX ORDER — FLUTICASONE PROPIONATE 110 UG/1
2 AEROSOL, METERED RESPIRATORY (INHALATION) 2 TIMES DAILY
Status: DISCONTINUED | OUTPATIENT
Start: 2021-01-14 | End: 2021-01-17 | Stop reason: HOSPADM

## 2021-01-14 RX ORDER — MORPHINE SULFATE 2 MG/ML
2 INJECTION, SOLUTION INTRAMUSCULAR; INTRAVENOUS EVERY 4 HOURS PRN
Status: DISCONTINUED | OUTPATIENT
Start: 2021-01-14 | End: 2021-01-14 | Stop reason: DRUGHIGH

## 2021-01-14 RX ORDER — ESCITALOPRAM OXALATE 10 MG/1
20 TABLET ORAL DAILY
Status: DISCONTINUED | OUTPATIENT
Start: 2021-01-14 | End: 2021-01-17 | Stop reason: HOSPADM

## 2021-01-14 RX ORDER — OXYCODONE HYDROCHLORIDE AND ACETAMINOPHEN 5; 325 MG/1; MG/1
1 TABLET ORAL EVERY 4 HOURS PRN
Status: DISCONTINUED | OUTPATIENT
Start: 2021-01-14 | End: 2021-01-17 | Stop reason: HOSPADM

## 2021-01-14 RX ORDER — HEPARIN SODIUM 1000 [USP'U]/ML
40 INJECTION, SOLUTION INTRAVENOUS; SUBCUTANEOUS PRN
Status: DISCONTINUED | OUTPATIENT
Start: 2021-01-14 | End: 2021-01-16

## 2021-01-14 RX ORDER — SODIUM CHLORIDE 0.9 % (FLUSH) 0.9 %
10 SYRINGE (ML) INJECTION PRN
Status: DISCONTINUED | OUTPATIENT
Start: 2021-01-14 | End: 2021-01-17 | Stop reason: HOSPADM

## 2021-01-14 RX ORDER — HEPARIN SODIUM 1000 [USP'U]/ML
80 INJECTION, SOLUTION INTRAVENOUS; SUBCUTANEOUS PRN
Status: DISCONTINUED | OUTPATIENT
Start: 2021-01-14 | End: 2021-01-16

## 2021-01-14 RX ORDER — HEPARIN SODIUM 10000 [USP'U]/100ML
18 INJECTION, SOLUTION INTRAVENOUS CONTINUOUS
Status: DISCONTINUED | OUTPATIENT
Start: 2021-01-14 | End: 2021-01-15

## 2021-01-14 RX ORDER — MORPHINE SULFATE 4 MG/ML
4 INJECTION, SOLUTION INTRAMUSCULAR; INTRAVENOUS ONCE
Status: COMPLETED | OUTPATIENT
Start: 2021-01-14 | End: 2021-01-14

## 2021-01-14 RX ORDER — ONDANSETRON 2 MG/ML
4 INJECTION INTRAMUSCULAR; INTRAVENOUS EVERY 6 HOURS PRN
Status: DISCONTINUED | OUTPATIENT
Start: 2021-01-14 | End: 2021-01-17 | Stop reason: HOSPADM

## 2021-01-14 RX ORDER — ACETAMINOPHEN 650 MG/1
650 SUPPOSITORY RECTAL EVERY 6 HOURS PRN
Status: DISCONTINUED | OUTPATIENT
Start: 2021-01-14 | End: 2021-01-17 | Stop reason: HOSPADM

## 2021-01-14 RX ORDER — SODIUM CHLORIDE 0.9 % (FLUSH) 0.9 %
10 SYRINGE (ML) INJECTION EVERY 12 HOURS SCHEDULED
Status: DISCONTINUED | OUTPATIENT
Start: 2021-01-14 | End: 2021-01-17 | Stop reason: HOSPADM

## 2021-01-14 RX ORDER — ACETAMINOPHEN 325 MG/1
650 TABLET ORAL EVERY 6 HOURS PRN
Status: DISCONTINUED | OUTPATIENT
Start: 2021-01-14 | End: 2021-01-17 | Stop reason: HOSPADM

## 2021-01-14 RX ORDER — POLYETHYLENE GLYCOL 3350 17 G/17G
17 POWDER, FOR SOLUTION ORAL DAILY PRN
Status: DISCONTINUED | OUTPATIENT
Start: 2021-01-14 | End: 2021-01-17 | Stop reason: HOSPADM

## 2021-01-14 RX ORDER — MORPHINE SULFATE 4 MG/ML
4 INJECTION, SOLUTION INTRAMUSCULAR; INTRAVENOUS
Status: DISCONTINUED | OUTPATIENT
Start: 2021-01-14 | End: 2021-01-17 | Stop reason: HOSPADM

## 2021-01-14 RX ORDER — CYCLOBENZAPRINE HCL 10 MG
10 TABLET ORAL 3 TIMES DAILY PRN
Status: DISCONTINUED | OUTPATIENT
Start: 2021-01-14 | End: 2021-01-17 | Stop reason: HOSPADM

## 2021-01-14 RX ADMIN — MORPHINE SULFATE 4 MG: 4 INJECTION INTRAVENOUS at 10:01

## 2021-01-14 RX ADMIN — ACETAMINOPHEN 650 MG: 325 TABLET ORAL at 10:03

## 2021-01-14 RX ADMIN — HEPARIN SODIUM 6160 UNITS: 1000 INJECTION INTRAVENOUS; SUBCUTANEOUS at 20:38

## 2021-01-14 RX ADMIN — Medication 10 ML: at 20:37

## 2021-01-14 RX ADMIN — ENOXAPARIN SODIUM 105 MG: 120 INJECTION SUBCUTANEOUS at 10:02

## 2021-01-14 RX ADMIN — MORPHINE SULFATE 4 MG: 4 INJECTION INTRAVENOUS at 22:35

## 2021-01-14 RX ADMIN — HEPARIN SODIUM 18 UNITS/KG/HR: 10000 INJECTION, SOLUTION INTRAVENOUS at 20:40

## 2021-01-14 RX ADMIN — Medication 2 PUFF: at 19:42

## 2021-01-14 RX ADMIN — OXYCODONE HYDROCHLORIDE AND ACETAMINOPHEN 1 TABLET: 5; 325 TABLET ORAL at 20:40

## 2021-01-14 RX ADMIN — MORPHINE SULFATE 4 MG: 4 INJECTION INTRAVENOUS at 19:09

## 2021-01-14 RX ADMIN — ESCITALOPRAM OXALATE 20 MG: 10 TABLET ORAL at 10:03

## 2021-01-14 RX ADMIN — MORPHINE SULFATE 2 MG: 2 INJECTION, SOLUTION INTRAMUSCULAR; INTRAVENOUS at 05:38

## 2021-01-14 RX ADMIN — Medication 2 PUFF: at 08:49

## 2021-01-14 RX ADMIN — OXYCODONE HYDROCHLORIDE AND ACETAMINOPHEN 1 TABLET: 5; 325 TABLET ORAL at 15:02

## 2021-01-14 RX ADMIN — Medication 10 ML: at 10:02

## 2021-01-14 RX ADMIN — MORPHINE SULFATE 4 MG: 4 INJECTION INTRAVENOUS at 01:52

## 2021-01-14 ASSESSMENT — PAIN DESCRIPTION - PAIN TYPE
TYPE: ACUTE PAIN

## 2021-01-14 ASSESSMENT — PAIN - FUNCTIONAL ASSESSMENT
PAIN_FUNCTIONAL_ASSESSMENT: PREVENTS OR INTERFERES SOME ACTIVE ACTIVITIES AND ADLS

## 2021-01-14 ASSESSMENT — PAIN DESCRIPTION - ORIENTATION
ORIENTATION: RIGHT
ORIENTATION: RIGHT

## 2021-01-14 ASSESSMENT — ENCOUNTER SYMPTOMS
SHORTNESS OF BREATH: 1
COUGH: 1
COLOR CHANGE: 0
VOMITING: 0
STRIDOR: 0
DIARRHEA: 0
WHEEZING: 0
CHEST TIGHTNESS: 0
NAUSEA: 0
BACK PAIN: 1
ABDOMINAL PAIN: 0

## 2021-01-14 ASSESSMENT — PAIN DESCRIPTION - PROGRESSION
CLINICAL_PROGRESSION: GRADUALLY WORSENING
CLINICAL_PROGRESSION: NOT CHANGED
CLINICAL_PROGRESSION: NOT CHANGED

## 2021-01-14 ASSESSMENT — PAIN DESCRIPTION - FREQUENCY
FREQUENCY: CONTINUOUS

## 2021-01-14 ASSESSMENT — PAIN SCALES - GENERAL
PAINLEVEL_OUTOF10: 10
PAINLEVEL_OUTOF10: 5
PAINLEVEL_OUTOF10: 8
PAINLEVEL_OUTOF10: 8
PAINLEVEL_OUTOF10: 0
PAINLEVEL_OUTOF10: 10

## 2021-01-14 ASSESSMENT — PAIN DESCRIPTION - DESCRIPTORS
DESCRIPTORS: CONSTANT;DISCOMFORT
DESCRIPTORS: CONSTANT;DISCOMFORT;THROBBING

## 2021-01-14 ASSESSMENT — PAIN DESCRIPTION - ONSET
ONSET: ON-GOING
ONSET: GRADUAL
ONSET: ON-GOING

## 2021-01-14 ASSESSMENT — PAIN DESCRIPTION - LOCATION: LOCATION: RIB CAGE

## 2021-01-14 NOTE — PROGRESS NOTES
Patient admitted to room 323 from [place] [facility] [place] . Patient oriented to room, call light, bed rails, phone, lights and bathroom. Patient instructed about the schedule of the day including: vital sign frequency, lab draws, possible tests, frequency of MD and staff rounds, daily weights, I &O's and prescribed diet. bed alarm in place, patient aware of placement and reason. Telemetry box in place, patient aware of placement and reason. Bed locked, in lowest position, side rails up 2/4, call light within reach. Recliner Assessment  Patient is able to demonstrate the ability to move from a reclining position to an upright position within the recliner. 4 Eyes Skin Assessment     The patient is being assess for   Admission    I agree that 2 RN's have performed a thorough Head to Toe Skin Assessment on the patient. ALL assessment sites listed below have been assessed. Areas assessed by both nurses:   [x]   Head, Face, and Ears   [x]   Shoulders, Back, and Chest, Abdomen  [x]   Arms, Elbows, and Hands   [x]   Coccyx, Sacrum, and Ischium  [x]   Legs, Feet, and Heels        Scars to L ankle/ foot     **SHARE this note so that the co-signing nurse is able to place an eSignature**    Co-signer eSignature: {Esignature:[id number]}    Does the Patient have Skin Breakdown?   No          [place] Prevention initiated:  No   Wound Care Orders initiated:  No      [facility] nurse consulted for Pressure Injury (Stage 3,4, Unstageable, DTI, NWPT, Complex wounds)and New or Established Ostomies:  No      Primary Nurse eSignature: Electronically signed by Rosa Elena Cook RN on 1/14/21 at 3:11 AM EST

## 2021-01-14 NOTE — ED NOTES
[facility] present. Pt requested more pain meds pain at 8. Medicated as ordered.  Report given at bedside     Aguila Oliver RN  01/14/21 4412

## 2021-01-14 NOTE — H&P
Hospital Medicine History & Physical      PCP: Juanito Browne MD    Date of Admission: 1/13/2021    Date of Service: Pt seen/examined on 1/14/2021    Chief Complaint:    Chief Complaint   Patient presents with    Rib Pain     History Of Present Illness: The patient is a 26 y.o. female with asthma, anxiety and depression, vitamin D deficiency who presented to [facility] ED with complaint of rib pain. Patient reports that she had a foot surgery on 12/23 and was recovering well but started with URI symptoms and was dx with COVID on 12/31 by a drive throughout COVID testing site. She reports that her cough and symptoms were improving until recently when she woke up with pain in both of her ribs. She came to the ER for the rib pain and was dx with bilateral PEs. She denies any chest pain but has continued left sided rib pain. Was on supplemental O2 on admission but has since be weaned off. Patient did have one episode of hematemesis. Patient report she suddenly became very nauseous and vomiting with bright red blood and blood clots per the nurse. Patient has never had anything like this before and denies any associated abdominal pain. Past Medical History:        Diagnosis Date    Anxiety     Asthma     Depression     Kidney stone     Vitamin D deficiency        Past Surgical History:        Procedure Laterality Date    ARTHRODESIS Left 11/4/2020    SUBTALAR JOINT FUSION, CALCANEO-NAVICULAR COALITION RESECTION LEFT FOOT performed by Avril Castillo DPM at [address] Left 12/23/2020    REPAIR OF MALUNION LEFT FOOT performed by Avril Castillo DPM at [address] [facility] Left     LEEP         Medications Prior to Admission:    Prior to Admission medications    Medication Sig Start Date End Date Taking? Authorizing Provider   oxyCODONE-acetaminophen (PERCOCET) 5-325 MG per tablet Take 1 tablet by mouth every 4 hours as needed for Pain.    Yes Historical Provider, MD BUPROPION HCL PO Take by mouth   Yes Historical Provider, MD   ondansetron (ZOFRAN ODT) 4 MG disintegrating tablet Take 1 tablet by mouth every 8 hours as needed for Nausea or Vomiting 10/3/19  Yes Francisco Amezquita DO   cyclobenzaprine (FLEXERIL) 10 MG tablet Take 10 mg by mouth 3 times daily as needed for Muscle spasms   Yes Historical Provider, MD   escitalopram (LEXAPRO) 20 MG tablet Take 20 mg by mouth daily   Yes Historical Provider, MD   beclomethasone (QVAR REDIHALER) 80 MCG/ACT AERB inhaler Inhale 1 puff into the lungs 2 times daily   Yes Historical Provider, MD   ondansetron (ZOFRAN) 4 MG tablet Take 1 tablet by mouth daily as needed for Nausea or Vomiting 10/26/19   Shakir Erickson DO       Allergies:  Patient has no known allergies. Social History:  The patient currently lives at home     TOBACCO:   reports that she quit smoking about 7 years ago. Her smoking use included cigarettes. She has a 5.00 pack-year smoking history. She has never used smokeless tobacco.  ETOH:   reports current alcohol use. Family History:   Positive as follows:    History reviewed. No pertinent family history. REVIEW OF SYSTEMS:     Constitutional: Negative for fever   HENT: Negative for sore throat   Eyes: Negative for redness   Respiratory: + SOB, cough   Cardiovascular: Negative for chest pain   Gastrointestinal: +N/V, denies abdominal pain   Genitourinary: Negative for hematuria   Musculoskeletal: + rib pain    Skin: Negative for rash   Neurological: Negative for syncope   Hematological: Negative for adenopathy   Psychiatric/Behavorial: Negative for anxiety    PHYSICAL EXAM:    /83   Pulse 92   Temp 102.3 °F (39.1 °C) (Oral)   Resp 20   Ht 5' 3\" (1.6 m)   Wt 252 lb (114.3 kg)   LMP 01/06/2021   SpO2 99%   BMI 44.64 kg/m²   Gen: No distress. Alert. Eyes: PERRL. No sclera icterus. No conjunctival injection. ENT: No discharge. Pharynx clear. Neck:  Trachea midline.   Resp: No accessory muscle use. No crackles. No wheezes. No rhonchi. Diminished breath sounds in bilateral bases   CV: Regular rate. Regular rhythm. No murmur. No rub. No edema. Capillary Refill: Brisk,< 3 seconds   Peripheral Pulses: +2 palpable, equal bilaterally   GI: mild epigastric TTP Non-distended. Normal bowel sounds. Skin: Warm and dry. M/S: No cyanosis. No joint deformity. No clubbing. Neuro: Awake. Grossly nonfocal    Psych: Oriented x 3. No anxiety or agitation. CBC:   Recent Labs     01/13/21 2035 01/14/21 0438   WBC 14.7* 14.4*   HGB 13.9 12.8   HCT 42.3 39.2   MCV 80.7 82.0    307     BMP:   Recent Labs     01/13/21 2035 01/14/21 0438    135*   K 3.8 3.6    100   CO2 23 22   BUN 11 11   CREATININE 0.8 0.8     LIVER PROFILE:   Recent Labs     01/13/21 2035   AST 16   ALT 47*   BILITOT 0.8   ALKPHOS 135*     UA:  Recent Labs     01/13/21 2035   COLORU Yellow   PHUR 5.5   WBCUA 10-20*   RBCUA 3-4   BACTERIA 3+*   CLARITYU SL CLOUDY*   SPECGRAV >=1.030   LEUKOCYTESUR Negative   UROBILINOGEN 0.2   BILIRUBINUR SMALL*   BLOODU TRACE-INTACT*   GLUCOSEU Negative          CARDIAC ENZYMES  Recent Labs     01/13/21 2035   TROPONINI <0.01     CULTURES  Urine culture: Pending    EKG:  I have reviewed the EKG with the following interpretation:   Sinus rhythm with occasional Premature ventricular complexes  Nonspecific ST and T wave abnormality  No previous ECGs available    RADIOLOGY  CT CHEST PULMONARY EMBOLISM W CONTRAST   Final Result   Couple acute segmental pulmonary emboli within the lower lobes. Multifocal consolidative changes within the lower lungs compatible with   pneumonia with additional areas of scarring or atelectasis. Fatty liver. Critical results were called by Dr. Ricky Nuñez to [facility] on   1/13/2021 at 22:16. XR CHEST PORTABLE   Preliminary Result   1. Evaluation is significantly limited due to low lung volumes.    2. Airspace opacities in the mid to lower lung zone which may represent   atelectasis or infection. Pertinent previous results reviewed   SARS-CoV-2 Abnormal  12/31/2020 12:00 AM Unknown   DETECTED          ASSESSMENT/PLAN:  Acute bilateral PEs  -With recent foot surgery as well as Covid  -Continue full dose anticoagulation with Lovenox, will need to transition to PO-->with hematemesis, transition to heparin gtt overnight, if no recurrence of hematemesis, would start Eliquis tomorrow   -No evidence of acute right heart strain, troponin within normal limits  -Pulmonology consult   - PRN pain control    COVID-19 pneumonia  -CT shows multifocal consolidative changes in the lower lungs compatible with pneumonia  -+ Covid on 12/31  -Droplet plus precautions  -No acute associated hypoxia, no indication for Decadron, remdesivir or CCP at this time    Elevated LFTs  -Suspect secondary to COVID infection  -CT scan does show fatty liver  -Recommend weight loss    Leukocytosis  -Possible UTI, urine culture pending  - Denies any symptoms   -Check procalcitonin WNL  -Febrile this morning (1/14) at 102.3-_>possiby 2/2 COVID    Recent surgery  -Status post repair of malunion of left foot with Dr. Hussein Wheeler on 12/23    Anxiety  Depression  -Continue home medications    Morbid Obesity  - Body mass index is 44.64 kg/m². - Complicating assessment and treatment. Placing patient at risk for multiple co-morbidities as well as early death and contributing to the patient's presentation.   - Counseled on weight loss.     DVT Prophylaxis: Lovenox, full dose-->changed to heparin gtt  Diet: DIET GENERAL;   Code Status: Full Code    Kay Brown PA-C  1/14/2021 2:59 PM

## 2021-01-14 NOTE — PROGRESS NOTES
Patient pain controlled with prn percocet. Safety measures in place. No further bloody emesis noted at this time. Afebrile at this time.

## 2021-01-14 NOTE — CARE COORDINATION
Chart review completed. Pt is Covid-19 +. Attempted calling pt's bedside phone and cell phone of [phone] but there was no answer. CM will continue to attempt the assessment when able. Please notify CM if needs or concerns arise. Addendum at 10:52am:       Case Management Assessment  Initial Evaluation      Patient Name: Dorie Kang  YOB: 1990  Diagnosis: Acute pulmonary embolism, unspecified pulmonary embolism type, unspecified whether acute cor pulmonale present [facility]) [I26.99]  Date / Time: 1/13/2021  6:22 PM    Admission status/Date: 01/13/2021 Inpatient   Chart Reviewed: Yes. Covid-19 + since 12/29/2020 per chart review      Patient Interviewed: Yes via call to room via speaker at RN station  Family Interviewed:  No      Hospitalization in the last 30 days:  No      Health Care Decision Maker :   Primary Decision Maker: Candice Uriostegui - [phone]    Secondary Decision Maker: mani morales - Brother/Sister - [phone]    (CM - must 1st enter selection under Navigator - emergency contact- [address] Relationship and pick relationship)   Who do you trust or have selected to make healthcare decisions for you      Met with: pt  Interview conducted  (bedside/phone): speaker to room via phone at RN station    Current PCP: MD Davis Mendoza 46 required for SNF : Y          3 night stay required -  N    ADLS  Support Systems/Care Needs:  family  Transportation: self    Meal Preparation: self    Housing  Living Arrangements: Pt lives at home with her  and son.   Steps: 2-3 but can avoid these  Intent for return to present living arrangements: Yes  Identified Issues: None noted    Home Care Information  Active with Home Health Care : No Agency:(Services)     Passport/Waiver : No  :                      Phone Number:    Passport/Waiver Services: n/a         Durable Medical Equiptment   DME Provider:   Equipment: Walker___Cane___RTS___ BSC___Shower Chair___Hospital Bed___W/C____Other___Knee Scooter_____  02 at ____Liter(s)---wears(frequency)_______ [facility] ___ CPAP___ BiPap___   N/A____      Home O2 Use :  No    If No for home O2---if presently on O2 during hospitalization:  No  if yes CM to follow for potential DC O2 need  Informed of need for care provider to bring portable home O2 tank on day of discharge for nursing to connect prior to leaving:   Not Indicated  Verbalized agreement/Understanding:   Not Indicated    Community Service Affiliation  Dialysis:  No    · Agency:  · Location:  · Dialysis Schedule:  · Phone:   · Fax: Other Community Services: n/a    DISCHARGE PLAN: Explained Case Management role/services. Chart review completed. Spoke with pt via call to room by using the speaker phone at [facility]. Pt reported being independent at home and plans to return. She has no services in the community. She denied needs or questions for CM at this time. CM will follow for possible O2 as pt has required it at times throughout this stay. Please notify CM if needs or concerns arise.

## 2021-01-14 NOTE — ACP (ADVANCE CARE PLANNING)
Advance Care Planning     General Advance Care Planning (ACP) Conversation    Date of Conversation: 1/13/2021  Conducted with: Patient with Decision Making Capacity. Confirmed with CHANTAL Simpson (pt's RN) that pt is alert and oriented via phone call. Healthcare Decision Maker:      Primary Decision Maker: Shannon Johnson - [phone]    Secondary Decision Maker: mani morales - Brother/Sister - [phone]    Click here to complete [place] including selection of the Healthcare Decision Maker Relationship (ie \"Primary\")  Today we discussed who she wants to be her decision makers. Pt wants her  Elva Rodriguez to be the primary decision maker and her sister Carlo Blevins to be the secondary decision maker. She wants CPR and a Vent if medically necessary. She denied a consult to spiritual care to assist with POA and living will documentation. pt is a full code in epic. Updated CHANTAL Simpson (pt's RN)    Content/Action Overview:  Pt declined a consult to spiritual Care.  Pt RN aware  Reviewed DNR/DNI and patient elects Full Code (Attempt Resuscitation)  ventilation preferences  Pt wants a Vent and CPR if medically necessary    Length of Voluntary ACP Conversation in minutes:  <16 minutes (Non-Billable)    Verónica Ponce MSW,LSW

## 2021-01-14 NOTE — PLAN OF CARE
Dx with covid 12/29, afebrile not hypoxemic, presnting with chest pain, (+) bilat pe, recent foot surgery as well

## 2021-01-14 NOTE — ED PROVIDER NOTES
[facility] Name: Zeyad John  MRN: [id number]  [place] 1990  Date of evaluation: 1/13/2021  Provider: Alva Dang MD  PCP: Sarthak Ngo MD      [address] [place] [address]       Chief Complaint   Patient presents with    Rib Pain       HISTORY OFPRESENT ILLNESS   (Location/Symptom, Timing/Onset, Context/Setting, Quality, Duration, Modifying Factors,Severity)  Note limiting factors. Zeyad John is a 26 y.o. female presenting today due to concern for being diagnosed with Covid December 29, 2020, and ultimately having symptoms wax and wane over the last 2 weeks but then starting yesterday she developed worsening right-sided chest pain with breathing and got so bad today that she decided to come to the emergency department for further evaluation. She tried taking a Percocet from a recent foot surgery that she had a few weeks ago but that did not help with the pain. She denies any fever. No vomiting or diarrhea. No current loss of taste or smell although she states that she had that sensation earlier in her disease course. No abnormal leg swelling or history of blood clots. She denies any falls or trauma. She has a mild headache. No numbness or weakness in the arms or legs. Due to worsening right-sided chest pain and right-sided upper back discomfort, she came to the emergency department for further evaluation. It does hurt to take a deep breath on the right side. REVIEW OF SYSTEMS    (2-9 systems for level 4, 10 or more for level 5)     Review of Systems   Constitutional: Positive for fatigue. Negative for chills, diaphoresis and fever. HENT: Negative for congestion. Respiratory: Positive for cough and shortness of breath. Negative for chest tightness, wheezing and stridor. Cardiovascular: Positive for chest pain. Gastrointestinal: Negative for abdominal pain, diarrhea, nausea and vomiting.    Genitourinary: Negative for difficulty urinating, dysuria, flank pain and urgency. Musculoskeletal: Positive for back pain (right upper back). Negative for neck pain. Skin: Negative for color change, rash and wound. Neurological: Positive for headaches (mild). Negative for syncope, weakness and numbness. Hematological: Does not bruise/bleed easily. Psychiatric/Behavioral: Negative for confusion. Positives and Pertinent negatives as per HPI. PASTMEDICAL HISTORY     Past Medical History:   Diagnosis Date    Anxiety     Asthma     Depression     Kidney stone     Vitamin D deficiency          SURGICAL HISTORY       Past Surgical History:   Procedure Laterality Date    ARTHRODESIS Left 11/4/2020    SUBTALAR JOINT FUSION, CALCANEO-NAVICULAR COALITION RESECTION LEFT FOOT performed by Jethro Fleischer, DPM at [address] Left 12/23/2020    REPAIR OF MALUNION LEFT FOOT performed by Jethro Fleischer, DPM at [address] [facility] Left     LEEP           CURRENT MEDICATIONS       Previous Medications    BECLOMETHASONE (QVAR REDIHALER) 80 MCG/ACT AERB INHALER    Inhale 1 puff into the lungs 2 times daily    BUPROPION HCL PO    Take by mouth    CYCLOBENZAPRINE (FLEXERIL) 10 MG TABLET    Take 10 mg by mouth 3 times daily as needed for Muscle spasms    ESCITALOPRAM (LEXAPRO) 20 MG TABLET    Take 20 mg by mouth daily    ONDANSETRON (ZOFRAN ODT) 4 MG DISINTEGRATING TABLET    Take 1 tablet by mouth every 8 hours as needed for Nausea or Vomiting    ONDANSETRON (ZOFRAN) 4 MG TABLET    Take 1 tablet by mouth daily as needed for Nausea or Vomiting    OXYCODONE-ACETAMINOPHEN (PERCOCET) 5-325 MG PER TABLET    Take 1 tablet by mouth every 4 hours as needed for Pain. ALLERGIES     Patient has no known allergies. FAMILY HISTORY     History reviewed. No pertinent family history.        SOCIAL HISTORY       Social History     Socioeconomic History    Marital status:      Spouse name: None    Number of children: None    Years of education: None    Highest education level: None   Occupational History    None   Social Needs    Financial resource strain: None    Food insecurity     Worry: None     Inability: None    Transportation needs     Medical: None     Non-medical: None   Tobacco Use    Smoking status: Former Smoker     Packs/day: 0.25     Types: Cigarettes    Smokeless tobacco: Never Used   Substance and Sexual Activity    Alcohol use: Yes     Comment: rare    Drug use: No    Sexual activity: None   Lifestyle    Physical activity     Days per week: None     Minutes per session: None    Stress: None   Relationships    Social connections     Talks on phone: None     Gets together: None     Attends Yazidi service: None     Active member of club or organization: None     Attends meetings of clubs or organizations: None     Relationship status: None    Intimate partner violence     Fear of current or ex partner: None     Emotionally abused: None     Physically abused: None     Forced sexual activity: None   Other Topics Concern    None   Social History Narrative    None       SCREENINGS    Andrew Coma Scale  Eye Opening: Spontaneous  Best Verbal Response: Oriented  Best Motor Response: Obeys commands  Bladimir Coma Scale Score: 15           PHYSICAL EXAM    (up to 7 for level 4, 8 or more for level 5)     ED Triage Vitals [01/13/21 1829]   BP Temp Temp Source Pulse Resp SpO2 Height Weight   126/70 98.3 °F (36.8 °C) Oral 99 16 97 % 5' 3\" (1.6 m) 240 lb (108.9 kg)       Physical Exam  Vitals signs and nursing note reviewed. Constitutional:       General: She is awake. She is in acute distress (mild). Appearance: Normal appearance. She is well-developed and well-groomed. She is morbidly obese. She is not ill-appearing, toxic-appearing or diaphoretic. Interventions: She is not intubated. HENT:      Head: Normocephalic and atraumatic.       Right Ear: External ear normal.      Left Ear: External ear normal.      Nose: Nose normal.   Eyes:      General:         Right eye: No discharge. Left eye: No discharge. Neck:      Musculoskeletal: Full passive range of motion without pain, normal range of motion and neck supple. Normal range of motion. No edema, erythema, neck rigidity, crepitus, injury, pain with movement, torticollis, spinous process tenderness or muscular tenderness. Trachea: Trachea and phonation normal. No tracheal deviation. Cardiovascular:      Rate and Rhythm: Normal rate and regular rhythm. Pulses: Normal pulses. Radial pulses are 2+ on the right side and 2+ on the left side. Pulmonary:      Effort: Pulmonary effort is normal. No tachypnea, bradypnea, accessory muscle usage, prolonged expiration, respiratory distress or retractions. She is not intubated. Breath sounds: Normal air entry. No stridor, decreased air movement or transmitted upper airway sounds. Examination of the right-lower field reveals rhonchi. Examination of the left-lower field reveals rhonchi. Rhonchi present. No decreased breath sounds, wheezing or rales. Chest:      Chest wall: No deformity, swelling, tenderness or crepitus. Comments: No tenderness to right lateral chest, anterior chest exam/breast exam not completed today   Abdominal:      General: Abdomen is flat. Bowel sounds are normal. There is no distension. Palpations: Abdomen is soft. Abdomen is not rigid. Tenderness: There is no abdominal tenderness. There is no right CVA tenderness, left CVA tenderness, guarding or rebound. Negative signs include Stoddard's sign and McBurney's sign. Musculoskeletal: Normal range of motion. General: No swelling, tenderness, deformity or signs of injury. Right shoulder: She exhibits normal range of motion, no tenderness, no bony tenderness and no deformity.       Left shoulder: She exhibits normal range of motion, no tenderness, no bony tenderness and no deformity. Cervical back: She exhibits normal range of motion, no tenderness, no bony tenderness, no swelling, no edema, no deformity, no laceration, no pain and no spasm. Thoracic back: She exhibits pain. She exhibits normal range of motion, no tenderness, no bony tenderness, no swelling, no edema, no deformity, no laceration and no spasm. Lumbar back: She exhibits normal range of motion, no tenderness, no bony tenderness and no pain. Back:       Right lower leg: No edema. Left lower leg: No edema. Left foot: No tenderness or swelling. Feet:    Skin:     General: Skin is warm and dry. Coloration: Skin is not jaundiced or pale. Findings: No bruising, erythema, lesion or rash. Neurological:      General: No focal deficit present. Mental Status: She is alert and oriented to person, place, and time. Mental status is at baseline. GCS: GCS eye subscore is 4. GCS verbal subscore is 5. GCS motor subscore is 6. Sensory: No sensory deficit. Motor: No weakness, tremor, atrophy, abnormal muscle tone or seizure activity. Psychiatric:         Attention and Perception: Attention normal.         Mood and Affect: Affect normal. Mood is anxious. Speech: Speech normal. Speech is not slurred. Behavior: Behavior normal. Behavior is cooperative. DIAGNOSTIC RESULTS   :    Labs Reviewed   URINALYSIS - Abnormal; Notable for the following components:       Result Value    Clarity, UA SL CLOUDY (*)     Bilirubin Urine SMALL (*)     Blood, Urine TRACE-INTACT (*)     Protein, UA 30 (*)     All other components within normal limits    Narrative:     Performed at:  [facility]. [facility]  [address] [place] [address].  [place]    Phone [phone]   CBC WITH AUTO DIFFERENTIAL - Abnormal; Notable for the following components:    WBC 14.7 (*)     RBC 5.24 (*)     Neutrophils Absolute 11.0 (*)     All other components within normal limits    Narrative:     Performed at:  [facility]. [facility]  [address] [place] [address]. [facility] [address]    Phone [phone]   COMPREHENSIVE METABOLIC PANEL - Abnormal; Notable for the following components:    Glucose 103 (*)     Total Protein 8.4 (*)     Albumin/Globulin Ratio 1.0 (*)     Alkaline Phosphatase 135 (*)     ALT 47 (*)     All other components within normal limits    Narrative:     Performed at:  [facility]. [facility]  [address] [place] [address]. [facility] [address]    Phone [phone]   MICROSCOPIC URINALYSIS - Abnormal; Notable for the following components:    WBC, UA 10-20 (*)     Epithelial Cells, UA 11-20 (*)     Bacteria, UA 3+ (*)     All other components within normal limits    Narrative:     Performed at:  [facility]. [facility]  [address] [place] [address]. [place] [address] [employer]   Phone [phone]   CULTURE, URINE   TROPONIN    Narrative:     Performed at:  [facility]. [facility]  [address] [place] [address]. [facility] Main [employer]   Phone [phone]   HCG, SERUM, QUALITATIVE    Narrative:     Performed at:  [facility]. [facility]  [address] [place] [address]. [place] [address] [employer]   Phone [phone]       All other labs were within normal range or not returned asof this dictation. EKG: All EKG's are interpreted by the Emergency Department Physician who either signs or Co-signs this chart in the absence of a cardiologist.    The Ekg interpreted by me shows  Sinus rhythm with occasional PVC noted with a rate of 99  Axis is   Normal  QTc is  within an acceptable range  Intervals and Durations are unremarkable.       ST Segments: nonspecific changes  No significant change from prior EKG dated - no old EKG  No STEMI           RADIOLOGY:   Non-plain film images such as CT, Ultrasound and MRI are read by the radiologistWillard Marquis images are visualized and preliminarily interpreted by the  ED Provider with the belowfindings:        Interpretation per the Radiologist below, if available at the time of this note:    CT CHEST PULMONARY EMBOLISM W CONTRAST   Final Result   Couple acute segmental pulmonary emboli within the lower lobes. Multifocal consolidative changes within the lower lungs compatible with   pneumonia with additional areas of scarring or atelectasis. Fatty liver. Critical results were called by Dr. Miroslava Cody to [facility] on   1/13/2021 at 22:16. XR CHEST PORTABLE   Preliminary Result   1. Evaluation is significantly limited due to low lung volumes. 2. Airspace opacities in the mid to lower lung zone which may represent   atelectasis or infection. PROCEDURES   Unless otherwise noted below, none     Procedures    CRITICAL CARE TIME   Time: 35 minutes   Includes repeat examinations, speaking with consultants, lab interpretation, charting, treating for acute pulmonary embolism with Lovenox along with receiving multiple doses of IV pain medication due to intractable right-sided chest pain  Excludes separate billable procedures. Patient at risk for serious decompensation if not treated for this life-threatening condition. CONSULTS: Spoke with Dr. Valentina Gould at [phone] for admission.    IP CONSULT TO HOSPITALIST    EMERGENCY DEPARTMENT COURSE and DIFFERENTIAL DIAGNOSIS/MDM:   Vitals:    Vitals:    01/13/21 2112 01/13/21 2235 01/13/21 2330 01/14/21 0151   BP: 111/79 113/82 107/74 111/74   Pulse: 99 97 94 101   Resp: 20 20 20 20   Temp:    100 °F (37.8 °C)   TempSrc:       SpO2: 94% 95% 96% 92%   Weight:       Height:           Patient was given the following medications:  Medications   morphine sulfate (PF) injection 4 mg (4 mg Intravenous Given 1/13/21 2041)   ondansetron (ZOFRAN) injection 4 mg (4 mg Intravenous Given 1/13/21 2037)   ketorolac (TORADOL) injection 15 mg (15 mg Intravenous Given 1/13/21 2039)   iopamidol (ISOVUE-370) 76 % injection 75 mL (75 mLs Intravenous Given 1/13/21 2146)   morphine sulfate (PF) injection 4 mg (4 mg Intravenous Given 1/13/21 2301)   enoxaparin (LOVENOX) injection 105 mg (105 mg Subcutaneous Given 1/13/21 2304)   morphine sulfate (PF) injection 4 mg (4 mg Intravenous Given 1/14/21 0152)     Patient was evaluated due to worsening right-sided chest pain since yesterday that was associated with deep breaths. I am considering pleurisy, pulmonary embolism, pneumonia, rib fracture, cholecystitis, costochondritis, amongst other pathology. CT scan was obtained due to concerning story and was positive for multiple pulmonary emboli. She was started on Lovenox due to concern with this. No sign of right heart strain. She required multiple doses of IV pain medication due to worsening right-sided chest discomfort. She was ultimately transferred to [facility] for further care due to concern for Covid infection with acute pulmonary embolism. She was in no acute distress at time of transfer and felt comfortable with this plan. The patient tolerated their visit well. The patient and / or the family were informed of the results of any tests, a time was given to answer questions. FINAL IMPRESSION      1. Multiple subsegmental pulmonary emboli without acute cor pulmonale ([facility] [address]    2. COVID-19          DISPOSITION/PLAN   DISPOSITION Admitted 01/13/2021 11:42:11 PM      PATIENT REFERRED TO:  No follow-up provider specified.     DISCHARGEMEDICATIONS:  New Prescriptions    No medications on file       DISCONTINUED MEDICATIONS:  Discontinued Medications    No medications on file              (Please note that portions of this note were completed with a voicerecognition program.  Efforts were made to edit the dictations but occasionally words are mis-transcribed.)    Flo Montgomery MD (electronically signed)            Flo Montgomery MD  01/14/21 7572

## 2021-01-14 NOTE — PROGRESS NOTES
Pharmacy to Manage Heparin Infusion per [facility]    Dx:PE  Pt wt = _77kg (adjusted BW)__ (will use adjusted wt if ABW > 120% IBW). Baseline aPTT = 1900_____ at ____. Pt received Lovenox 105mg at 10am today, Renal function WNL, Will start Heparin Drip at 8pm tonight, ordered baseline Ptt at 1900 today. High Dose Heparin Infusion  Heparin 80 units/kg IVP bolus followed by Heparin infusion at 18 units/kg/hr. Recheck aPTT in 6 hours, 1/15 at 0500  Goal aPTT = 49-76 seconds.   Mingo Redd Pharm D 1/82/762499:25 PM  .

## 2021-01-14 NOTE — CONSULTS
Patient is being seen at the request of Kimberly Ibarra  for a consultation for PE     HISTORY OF PRESENT ILLNESS: 25 yo female with asthma who had COVID diagnosed on December 29 with viral syndrome, now with 1 day sh/o severe right sided chest pain, pleuritic, associated with shortness of breath. Pain not relieved with percocet. Testing revealed acute PE. No similar prior events. She did have single episode where she brought up bright red blood -- was mixed in cup with water so she is not sure of amount. PAST MEDICAL HISTORY:  Past Medical History:   Diagnosis Date    Anxiety     Asthma     COVID-19 12/31/2020    Depression     Kidney stone     Vitamin D deficiency      PAST SURGICAL HISTORY:  Past Surgical History:   Procedure Laterality Date    ARTHRODESIS Left 11/4/2020    SUBTALAR JOINT FUSION, CALCANEO-NAVICULAR COALITION RESECTION LEFT FOOT performed by Kwesi Herrera DPM at [facility] Left 12/23/2020    REPAIR OF MALUNION LEFT FOOT performed by Kwesi Herrera DPM at [address] [place] [address] Left     LEE         FAMILY HISTORY:  No known clotting disorder     SOCIAL HISTORY:   reports that she quit smoking about 7 years ago. Her smoking use included cigarettes. She has a 5.00 pack-year smoking history. She has never used smokeless tobacco.    Scheduled Meds:   fluticasone  2 puff Inhalation BID    escitalopram  20 mg Oral Daily    sodium chloride flush  10 mL Intravenous 2 times per day    heparin (porcine)  80 Units/kg (Order-Specific) Intravenous Once     Continuous Infusions:   heparin (PORCINE) Infusion       PRN Meds:  cyclobenzaprine, oxyCODONE-acetaminophen, sodium chloride flush, promethazine **OR** ondansetron, polyethylene glycol, acetaminophen **OR** acetaminophen, morphine, heparin (porcine), heparin (porcine)    ALLERGIES:  Patient has No Known Allergies.     REVIEW OF SYSTEMS:  Constitutional:  fever, recent viral syndrome  HENT: Negative for sore throat  Eyes: Negative for redness   Respiratory: + for dyspnea, cough  Cardiovascular: + for chest pain  Gastrointestinal: Negative for vomiting, diarrhea   Genitourinary: Negative for hematuria   Musculoskeletal: Negative for arthralgias   Skin: Negative for rash  Neurological: Negative for syncope  Hematological: Negative for adenopathy  Psychiatric/Behavorial: Negative for anxiety    PHYSICAL EXAM:  Blood pressure 124/73, pulse 104, temperature 100.2 °F (37.9 °C), temperature source Oral, resp. rate 22, height 5' 3\" (1.6 m), weight 252 lb (114.3 kg), last menstrual period 01/06/2021, SpO2 93 %, not currently breastfeeding.' on RA  Gen: No distress. Eyes: PERRL. No sclera icterus. No conjunctival injection. ENT: No discharge. Pharynx clear. Neck: Trachea midline. No obvious mass. Resp: No accessory muscle use. + crackles. No wheezes. No rhonchi. No dullness on percussion. CV: Regular rate. Regular rhythm. No murmur or rub. No edema. Peripheral pulses are 2+. Capillary refill is less than 3 seconds. GI: Non-tender. Non-distended. No hernia. Skin: Warm and dry. No nodule on exposed extremities. Lymph: No cervical LAD. No supraclavicular LAD. M/S: No cyanosis. No joint deformity. No clubbing. Neuro: Awake. Alert. Moves all four extremities. Psych: Oriented x 3. No anxiety. LABS:  CBC:   Recent Labs     01/13/21 2035 01/14/21 0438 01/14/21  1221   WBC 14.7* 14.4*  --    HGB 13.9 12.8 13.4   HCT 42.3 39.2 42.0   MCV 80.7 82.0  --     307  --      BMP:   Recent Labs     01/13/21 2035 01/14/21 0438    135*   K 3.8 3.6    100   CO2 23 22   BUN 11 11   CREATININE 0.8 0.8     LIVER PROFILE:   Recent Labs     01/13/21 2035   AST 16   ALT 47*   BILITOT 0.8   ALKPHOS 135*     PT/INR: No results for input(s): PROTIME, INR in the last 72 hours. APTT: No results for input(s): APTT in the last 72 hours.   UA:  Recent Labs     01/13/21 2035   DANNY Johnson additional bleeding. I recommend changing to eliquis or xarelto in am if no significant additional hemoptysis/hematemesis. · I had a discussion regarding the risks and benefits of Eliquis & Xarelto, both in the absence of any anticoagulation and in comparison with other options such as Warfarin, Lovenox,  & Pradaxa. I specifically addressed risk of hemorrhagic stroke, bleeding outside the brain, procedural complications especially with epidural catheter placement and the need to continue to take Eliquis or Xarelto as prescribed (since stopping  may increase the risk of stroke).

## 2021-01-15 LAB
AMORPHOUS: ABNORMAL /HPF
APTT: 42.3 SEC (ref 24.2–36.2)
APTT: 47.9 SEC (ref 24.2–36.2)
APTT: 51.2 SEC (ref 24.2–36.2)
BILIRUBIN URINE: ABNORMAL
BLOOD, URINE: NEGATIVE
CLARITY: CLEAR
COLOR: ABNORMAL
EPITHELIAL CELLS, UA: ABNORMAL /HPF (ref 0–5)
GLUCOSE URINE: NEGATIVE MG/DL
HCT VFR BLD CALC: 38.2 % (ref 36–48)
HEMOGLOBIN: 12.4 G/DL (ref 12–16)
HYALINE CASTS: ABNORMAL /LPF (ref 0–2)
KETONES, URINE: NEGATIVE MG/DL
LEUKOCYTE ESTERASE, URINE: NEGATIVE
MICROSCOPIC EXAMINATION: YES
MUCUS: ABNORMAL /LPF
NITRITE, URINE: NEGATIVE
PH UA: 5.5 (ref 5–8)
PROCALCITONIN: 0.39 NG/ML (ref 0–0.15)
PROTEIN UA: 100 MG/DL
RBC UA: ABNORMAL /HPF (ref 0–4)
SPECIFIC GRAVITY UA: >=1.03 (ref 1–1.03)
URINE CULTURE, ROUTINE: NORMAL
URINE TYPE: ABNORMAL
UROBILINOGEN, URINE: 1 E.U./DL
WBC UA: ABNORMAL /HPF (ref 0–5)

## 2021-01-15 PROCEDURE — 94761 N-INVAS EAR/PLS OXIMETRY MLT: CPT

## 2021-01-15 PROCEDURE — 6360000002 HC RX W HCPCS: Performed by: INTERNAL MEDICINE

## 2021-01-15 PROCEDURE — 2060000000 HC ICU INTERMEDIATE R&B

## 2021-01-15 PROCEDURE — 6370000000 HC RX 637 (ALT 250 FOR IP): Performed by: HOSPITALIST

## 2021-01-15 PROCEDURE — 99232 SBSQ HOSP IP/OBS MODERATE 35: CPT | Performed by: INTERNAL MEDICINE

## 2021-01-15 PROCEDURE — 51702 INSERT TEMP BLADDER CATH: CPT

## 2021-01-15 PROCEDURE — 85014 HEMATOCRIT: CPT

## 2021-01-15 PROCEDURE — 99232 SBSQ HOSP IP/OBS MODERATE 35: CPT | Performed by: NURSE PRACTITIONER

## 2021-01-15 PROCEDURE — 94640 AIRWAY INHALATION TREATMENT: CPT

## 2021-01-15 PROCEDURE — 85018 HEMOGLOBIN: CPT

## 2021-01-15 PROCEDURE — 6360000002 HC RX W HCPCS: Performed by: HOSPITALIST

## 2021-01-15 PROCEDURE — 85730 THROMBOPLASTIN TIME PARTIAL: CPT

## 2021-01-15 PROCEDURE — 84145 PROCALCITONIN (PCT): CPT

## 2021-01-15 PROCEDURE — 2580000003 HC RX 258: Performed by: HOSPITALIST

## 2021-01-15 PROCEDURE — 81001 URINALYSIS AUTO W/SCOPE: CPT

## 2021-01-15 PROCEDURE — 36415 COLL VENOUS BLD VENIPUNCTURE: CPT

## 2021-01-15 RX ORDER — HEPARIN SODIUM 10000 [USP'U]/100ML
17.9 INJECTION, SOLUTION INTRAVENOUS CONTINUOUS
Status: DISCONTINUED | OUTPATIENT
Start: 2021-01-15 | End: 2021-01-16

## 2021-01-15 RX ORDER — HEPARIN SODIUM 1000 [USP'U]/ML
40 INJECTION, SOLUTION INTRAVENOUS; SUBCUTANEOUS ONCE
Status: COMPLETED | OUTPATIENT
Start: 2021-01-15 | End: 2021-01-15

## 2021-01-15 RX ADMIN — MORPHINE SULFATE 4 MG: 4 INJECTION INTRAVENOUS at 12:01

## 2021-01-15 RX ADMIN — HEPARIN SODIUM 3080 UNITS: 1000 INJECTION, SOLUTION INTRAVENOUS; SUBCUTANEOUS at 11:53

## 2021-01-15 RX ADMIN — OXYCODONE HYDROCHLORIDE AND ACETAMINOPHEN 1 TABLET: 5; 325 TABLET ORAL at 07:37

## 2021-01-15 RX ADMIN — ONDANSETRON HYDROCHLORIDE 4 MG: 2 INJECTION, SOLUTION INTRAMUSCULAR; INTRAVENOUS at 11:57

## 2021-01-15 RX ADMIN — Medication 2 PUFF: at 19:43

## 2021-01-15 RX ADMIN — MORPHINE SULFATE 4 MG: 4 INJECTION INTRAVENOUS at 19:09

## 2021-01-15 RX ADMIN — MORPHINE SULFATE 4 MG: 4 INJECTION INTRAVENOUS at 03:36

## 2021-01-15 RX ADMIN — ESCITALOPRAM OXALATE 20 MG: 10 TABLET ORAL at 08:28

## 2021-01-15 RX ADMIN — OXYCODONE HYDROCHLORIDE AND ACETAMINOPHEN 1 TABLET: 5; 325 TABLET ORAL at 15:50

## 2021-01-15 RX ADMIN — Medication 2 PUFF: at 07:48

## 2021-01-15 RX ADMIN — HEPARIN SODIUM 18 UNITS/KG/HR: 10000 INJECTION, SOLUTION INTRAVENOUS at 11:25

## 2021-01-15 RX ADMIN — OXYCODONE HYDROCHLORIDE AND ACETAMINOPHEN 1 TABLET: 5; 325 TABLET ORAL at 21:06

## 2021-01-15 RX ADMIN — OXYCODONE HYDROCHLORIDE AND ACETAMINOPHEN 1 TABLET: 5; 325 TABLET ORAL at 00:37

## 2021-01-15 RX ADMIN — Medication 10 ML: at 08:28

## 2021-01-15 RX ADMIN — ONDANSETRON HYDROCHLORIDE 4 MG: 2 INJECTION, SOLUTION INTRAMUSCULAR; INTRAVENOUS at 19:09

## 2021-01-15 ASSESSMENT — PAIN DESCRIPTION - ONSET
ONSET: AWAKENED FROM SLEEP
ONSET: ON-GOING
ONSET: GRADUAL

## 2021-01-15 ASSESSMENT — PAIN DESCRIPTION - LOCATION
LOCATION: BACK
LOCATION: HEAD;RIB CAGE
LOCATION: RIB CAGE
LOCATION: OTHER (COMMENT)

## 2021-01-15 ASSESSMENT — PAIN SCALES - GENERAL
PAINLEVEL_OUTOF10: 9
PAINLEVEL_OUTOF10: 6
PAINLEVEL_OUTOF10: 7
PAINLEVEL_OUTOF10: 3
PAINLEVEL_OUTOF10: 6

## 2021-01-15 ASSESSMENT — PAIN DESCRIPTION - FREQUENCY: FREQUENCY: CONTINUOUS

## 2021-01-15 ASSESSMENT — PAIN - FUNCTIONAL ASSESSMENT
PAIN_FUNCTIONAL_ASSESSMENT: PREVENTS OR INTERFERES SOME ACTIVE ACTIVITIES AND ADLS

## 2021-01-15 ASSESSMENT — PAIN DESCRIPTION - PAIN TYPE
TYPE: ACUTE PAIN

## 2021-01-15 ASSESSMENT — PAIN DESCRIPTION - DESCRIPTORS
DESCRIPTORS: CONSTANT;SHARP
DESCRIPTORS: CONSTANT;SHARP

## 2021-01-15 ASSESSMENT — PAIN DESCRIPTION - ORIENTATION
ORIENTATION: RIGHT
ORIENTATION: RIGHT

## 2021-01-15 ASSESSMENT — PAIN DESCRIPTION - PROGRESSION: CLINICAL_PROGRESSION: GRADUALLY WORSENING

## 2021-01-15 NOTE — PROGRESS NOTES
Pulmonary Progress Note  CC: shortness of breath, chest pain    Subjective:  Pain better, still with nausea. Single episode of coughing up quarter size bright red blood     IV line peripheral     EXAM:   /74   Pulse 92   Temp 101.3 °F (38.5 °C) (Oral)   Resp 16   Ht 5' 3\" (1.6 m)   Wt 253 lb 1.6 oz (114.8 kg)   LMP 01/06/2021   SpO2 95%   BMI 44.83 kg/m²  on RA   Constitutional:  No acute distress   HEENT: no scleral icterus  Neck: No tracheal deviation present. Cardiovascular: Normal heart sounds. Pulmonary/Chest: No wheezes. No rhonchi. No rales. No decreased breath sounds. No accessory muscle usage or stridor. Abdominal: Soft. Musculoskeletal: No cyanosis. No clubbing. Skin: Skin is warm and dry. Scheduled Meds:   fluticasone  2 puff Inhalation BID    escitalopram  20 mg Oral Daily    sodium chloride flush  10 mL Intravenous 2 times per day     Continuous Infusions:   heparin (PORCINE) Infusion 15.4 mL/hr (01/15/21 1156)     PRN Meds:  cyclobenzaprine, oxyCODONE-acetaminophen, sodium chloride flush, promethazine **OR** ondansetron, polyethylene glycol, acetaminophen **OR** acetaminophen, morphine, heparin (porcine), heparin (porcine)    Labs:  CBC:   Recent Labs     01/13/21 2035 01/14/21 0438 01/14/21  1221   WBC 14.7* 14.4*  --    HGB 13.9 12.8 13.4   HCT 42.3 39.2 42.0   MCV 80.7 82.0  --     307  --      BMP:   Recent Labs     01/13/21 2035 01/14/21  0438    135*   K 3.8 3.6    100   CO2 23 22   BUN 11 11   CREATININE 0.8 0.8       Cultures:  12/29/20 COVID positive     Films:  CTPA 1/14/21  Pulmonary Arteries: Filling defects are noted within segmental branches of   bilateral lower lobes compatible with acute pulmonary emboli.  No central   pulmonary embolism.  Normal caliber main pulmonary artery.       Mediastinum: Normal heart size.  No pericardial effusion.  No evidence of   right heart strain.  No mediastinal, hilar, or axillary lymphadenopathy. Normal caliber thoracic aorta.       Lungs/pleura: Lungs demonstrate patchy ground-glass airspace consolidation   within the lower lobes with additional areas of linear scarring or   atelectasis elsewhere in the lungs compatible with pneumonia and sequelae of   pneumonia.  No effusion or pneumothorax.       Upper Abdomen: Is otherwise within normal limits.       Soft Tissues/Bones: No acute bone or soft tissue abnormality.           Impression   Couple acute segmental pulmonary emboli within the lower lobes.       Multifocal consolidative changes within the lower lungs compatible with   pneumonia with additional areas of scarring or atelectasis.       Fatty liver. ASSESSMENT:  · - COVID-19 isolation, droplet plus  · Acute pulmonary embolism, provoked by recent SARS-CoV-2 infection   · COVID-19, diagnosed 12/29/20  · Right greater than left bibasilar infiltrates, CAP v COVID pneumonia - clinical history, imaging and low procalcitonin all suggest viral etiology   · Single episode hematemesis & single episode hemoptysis - hemoptysis is likely 2/2 PE or recent COVID pneumonia   · Nausea, possibly explained by lower lobe PE, but other causes should be considered  · Fever - may be 2/2 PE   · Fatty liver on CT     PLAN:  Supplemental oxygen to keep oxygen saturation greater than or equal to 92%  Full anticoagulation for 3 months per ACCP guidelines for provoked PE. Agree with heparin IV while monitoring for any signs of additional bleeding for another day. I recommend changing to eliquis or xarelto if no significant additional hemoptysis/hematemesis.     I d/w Dr. David Fitzpatrick

## 2021-01-15 NOTE — PROGRESS NOTES
Pt in bed, awake, A/O X4. Shift assessment complete, night meds given. Pt C/O 8/10 pain, PRN percocet given at this time. Heparin bolus and gtt started. . No other needs expressed. Call light in reach. Will monitor.  Remedios Law

## 2021-01-15 NOTE — PROGRESS NOTES
Patient called to inform this writer that she had coughed up blood. Bright red phlegm with what appeared to be a small clot noted inside of a cup. She states that she is having discomfort of \"6\" to \"lower lungs. \" She states this is normal discomfort d/t the PE. States her PRN percocet is effective.  Will notify MD.

## 2021-01-15 NOTE — PROGRESS NOTES
Pharmacy - RE:  High-dose Heparin drip    Current rate  13.9ml/hr (1390 units/hr)  aPTT drawn @ 1100  = 42.3 sec. Goal aPTT = 49 - 76 sec.   Per Protocol, Heparin bolus 3080 units, and increase drip to 15.4ml/hr (1540 units/hr)  Next Ptt at 1800 today  Jet BLACK 1/15/733039:29 AM  .

## 2021-01-15 NOTE — CONSULTS
1/15/21 0500 aPTT returned at 51.2 seconds. No bolus or change in rate of infusion at this time. Next aPTT ordered for 1100 on 1/15/21.   Beth Mendes [facility]

## 2021-01-15 NOTE — PROGRESS NOTES
Spoke with Pramod Alejo and Dr MORRIS regarding small amount of sputum with bright red blood and clot noted. They are aware.

## 2021-01-15 NOTE — PROGRESS NOTES
Patient o2 95%ra while lying in bed. States she feels okay without o2. She has within reach per request incase she starts to feel she needs it @2L. Will continue to monitor.

## 2021-01-15 NOTE — PROGRESS NOTES
Progress Note    Admit Date:  1/13/2021  Admitted for bilateral PEs     Subjective:  Ms. Jane Mckeon states pain in controlled. On 2 L O2. Fever up to 101.3    No hemoptysis or hematemesis. Objective:   Vitals:    01/15/21 0815   BP: 113/74   Pulse: 92   Resp: 16   Temp: 101.3 °F (38.5 °C)   SpO2: 95%         Intake/Output Summary (Last 24 hours) at 1/15/2021 1340  Last data filed at 1/15/2021 1128  Gross per 24 hour   Intake 503 ml   Output 400 ml   Net 103 ml       Physical Exam:    Gen: No distress. Alert. Eyes: PERRL. No sclera icterus. No conjunctival injection. ENT: No discharge. Pharynx clear. Neck: No JVD. Trachea midline. Resp: No accessory muscle use. No crackles. No wheezes. No rhonchi. LS diminished. CV: Regular rate. Regular rhythm. No murmur. No rub. No edema. Capillary Refill: Brisk,< 3 seconds   Peripheral Pulses: +2 palpable, equal bilaterally   GI: Non-tender. Non-distended. Normal bowel sounds. Skin: Warm and dry. No nodule on exposed extremities. No rash on exposed extremities. M/S: No cyanosis. No joint deformity. No clubbing. Neuro: Awake. Grossly nonfocal    Psych: Oriented x 3. No anxiety or agitation.        Scheduled Meds:   fluticasone  2 puff Inhalation BID    escitalopram  20 mg Oral Daily    sodium chloride flush  10 mL Intravenous 2 times per day       Continuous Infusions:   heparin (PORCINE) Infusion 15.4 mL/hr (01/15/21 1156)       PRN Meds:  cyclobenzaprine, oxyCODONE-acetaminophen, sodium chloride flush, promethazine **OR** ondansetron, polyethylene glycol, acetaminophen **OR** acetaminophen, morphine, heparin (porcine), heparin (porcine)      Data:  CBC:   Recent Labs     01/13/21 2035 01/14/21 0438 01/14/21  1221   WBC 14.7* 14.4*  --    HGB 13.9 12.8 13.4   HCT 42.3 39.2 42.0   MCV 80.7 82.0  --     307  --      BMP:   Recent Labs     01/13/21 2035 01/14/21 0438    135*   K 3.8 3.6    100   CO2 23 22   BUN 11 11   CREATININE 0.8 0.8 LIVER PROFILE:   Recent Labs     01/13/21 2035   AST 16   ALT 47*   BILITOT 0.8   ALKPHOS 135*     CULTURES  Urine Cx: Negative      RADIOLOGY  CT CHEST PULMONARY EMBOLISM W CONTRAST   Final Result   Couple acute segmental pulmonary emboli within the lower lobes. Multifocal consolidative changes within the lower lungs compatible with   pneumonia with additional areas of scarring or atelectasis. Fatty liver. Critical results were called by Dr. Antony Cesar to [facility] on   1/13/2021 at 22:16. XR CHEST PORTABLE   Preliminary Result   1. Evaluation is significantly limited due to low lung volumes. 2. Airspace opacities in the mid to lower lung zone which may represent   atelectasis or infection. Assessment/Plan:  Sepsis due to COVID (POA--Leukocytosis, tachycardia, febrile, source)   - Due to COVID PNA  - No Abx required   - Monitor symptoms   - BP stable, no need for pressors     Acute bilateral PEs  -With recent foot surgery as well as Covid  -Continue full dose anticoagulation with Lovenox, will need to transition to PO-->with hematemesis 1/14. Very small amount of hemoptysis on 1/15, continue heparin gtt for 1 more day and transition to PO in AM   -No evidence of acute right heart strain, troponin within normal limits  -Pulmonology consulted   - PRN pain control     COVID-19 pneumonia  -CT shows multifocal consolidative changes in the lower lungs compatible with pneumonia  -+ Covid on 12/31  -Droplet plus precautions  -No acute associated hypoxia, no indication for Decadron, remdesivir or CCP at this time     Elevated LFTs  -Suspect secondary to COVID infection  -CT scan does show fatty liver  -Recommend weight loss     Leukocytosis  -Possible UTI, urine culture pending  - Denies any symptoms   -Check procalcitonin WNL  -Febrile this morning 101. 3     Recent surgery  -Status post repair of malunion of left foot with Dr. Abigail Bales on 12/23     Anxiety  Depression  -Continue home medications     Morbid Obesity  - Body mass index is 44.64 kg/m². - Complicating assessment and treatment. Placing patient at risk for multiple co-morbidities as well as early death and contributing to the patient's presentation.   - Counseled on weight loss. DVT Prophylaxis: heparin gtt  Diet: DIET GENERAL;  Code Status: Full Code    Continue heparin gtt. No hemoptysis today. Plan to D/c home tomorrow on oral anticoagulant.      Scarlet SOLIS-C  1/15/2021

## 2021-01-15 NOTE — PROGRESS NOTES
Pt unable to urinate, Quevedo placed at this time.  Pt C/o 7/10 pain, PRN percocet given at this time Shanice Dawson

## 2021-01-15 NOTE — PROGRESS NOTES
Physician Progress Note      PATIENT:               Danielle De Los Santos  [facility] #:                  [id number]  :                       1990  ADMIT DATE:       2021 6:22 PM  [address] [place] DATE:  RESPONDING  PROVIDER #:        Hugo SULLIVAN          QUERY TEXT:    Dear Attending Provider,  Pt admitted with PE associated with COVID-19 (dx ) and noted to have   leukocytosis, fever >100.9, and tachycardia. If possible, please document in   progress notes and discharge summary if you are evaluating and/or treating: The medical record reflects the following:  Risk Factors: covid/pneumonia  Clinical Indicators: wbc-14.7, temp-98.3 up to 102.3 and 103, heart rate range   from 92 to 123  Treatment: Tylenol    Thank you for your assistance,  Dona Ponce RN,BSN,CCDS,CRCR  Options provided:  -- Sepsis due to COVID-19 present on admission  -- Sepsis due to COVID-19 not present on admission  -- COVID-19 without sepsis  -- Other - I will add my own diagnosis  -- Disagree - Not applicable / Not valid  -- Disagree - Clinically unable to determine / Unknown  -- Refer to Clinical Documentation Reviewer    PROVIDER RESPONSE TEXT:    This patient has sepsis due to COVID-19 present on admission.     Query created by: Vandana Calloway on 1/15/2021 10:13 AM      Electronically signed by:  Hugo SULLIVAN 1/15/2021 1:40 PM

## 2021-01-16 LAB
ANION GAP SERPL CALCULATED.3IONS-SCNC: 12 MMOL/L (ref 3–16)
APTT: 44.8 SEC (ref 24.2–36.2)
APTT: 46.1 SEC (ref 24.2–36.2)
BASOPHILS ABSOLUTE: 0 K/UL (ref 0–0.2)
BASOPHILS RELATIVE PERCENT: 0.2 %
BUN BLDV-MCNC: 8 MG/DL (ref 7–20)
CALCIUM SERPL-MCNC: 9.1 MG/DL (ref 8.3–10.6)
CHLORIDE BLD-SCNC: 96 MMOL/L (ref 99–110)
CO2: 24 MMOL/L (ref 21–32)
CREAT SERPL-MCNC: 0.6 MG/DL (ref 0.6–1.1)
EOSINOPHILS ABSOLUTE: 0.1 K/UL (ref 0–0.6)
EOSINOPHILS RELATIVE PERCENT: 1.8 %
GFR AFRICAN AMERICAN: >60
GFR NON-AFRICAN AMERICAN: >60
GLUCOSE BLD-MCNC: 111 MG/DL (ref 70–99)
HCT VFR BLD CALC: 38 % (ref 36–48)
HEMOGLOBIN: 11.9 G/DL (ref 12–16)
LYMPHOCYTES ABSOLUTE: 1.8 K/UL (ref 1–5.1)
LYMPHOCYTES RELATIVE PERCENT: 23.8 %
MAGNESIUM: 2.1 MG/DL (ref 1.8–2.4)
MCH RBC QN AUTO: 26.8 PG (ref 26–34)
MCHC RBC AUTO-ENTMCNC: 31.3 G/DL (ref 31–36)
MCV RBC AUTO: 85.6 FL (ref 80–100)
MONOCYTES ABSOLUTE: 0.7 K/UL (ref 0–1.3)
MONOCYTES RELATIVE PERCENT: 9.4 %
NEUTROPHILS ABSOLUTE: 5 K/UL (ref 1.7–7.7)
NEUTROPHILS RELATIVE PERCENT: 64.8 %
PDW BLD-RTO: 14.9 % (ref 12.4–15.4)
PLATELET # BLD: 218 K/UL (ref 135–450)
PMV BLD AUTO: 8.4 FL (ref 5–10.5)
POTASSIUM REFLEX MAGNESIUM: 3.2 MMOL/L (ref 3.5–5.1)
RBC # BLD: 4.43 M/UL (ref 4–5.2)
SODIUM BLD-SCNC: 132 MMOL/L (ref 136–145)
WBC # BLD: 7.6 K/UL (ref 4–11)

## 2021-01-16 PROCEDURE — 94640 AIRWAY INHALATION TREATMENT: CPT

## 2021-01-16 PROCEDURE — 83735 ASSAY OF MAGNESIUM: CPT

## 2021-01-16 PROCEDURE — 99232 SBSQ HOSP IP/OBS MODERATE 35: CPT | Performed by: INTERNAL MEDICINE

## 2021-01-16 PROCEDURE — 80048 BASIC METABOLIC PNL TOTAL CA: CPT

## 2021-01-16 PROCEDURE — 6370000000 HC RX 637 (ALT 250 FOR IP): Performed by: INTERNAL MEDICINE

## 2021-01-16 PROCEDURE — 85730 THROMBOPLASTIN TIME PARTIAL: CPT

## 2021-01-16 PROCEDURE — 2060000000 HC ICU INTERMEDIATE R&B

## 2021-01-16 PROCEDURE — 6360000002 HC RX W HCPCS: Performed by: HOSPITALIST

## 2021-01-16 PROCEDURE — 85025 COMPLETE CBC W/AUTO DIFF WBC: CPT

## 2021-01-16 PROCEDURE — 94761 N-INVAS EAR/PLS OXIMETRY MLT: CPT

## 2021-01-16 PROCEDURE — 6370000000 HC RX 637 (ALT 250 FOR IP): Performed by: HOSPITALIST

## 2021-01-16 PROCEDURE — 36415 COLL VENOUS BLD VENIPUNCTURE: CPT

## 2021-01-16 PROCEDURE — 6360000002 HC RX W HCPCS: Performed by: INTERNAL MEDICINE

## 2021-01-16 PROCEDURE — 2580000003 HC RX 258: Performed by: HOSPITALIST

## 2021-01-16 RX ORDER — OXYCODONE HYDROCHLORIDE AND ACETAMINOPHEN 5; 325 MG/1; MG/1
1 TABLET ORAL EVERY 6 HOURS PRN
Qty: 16 TABLET | Refills: 0 | Status: SHIPPED | OUTPATIENT
Start: 2021-01-16 | End: 2021-01-20

## 2021-01-16 RX ORDER — OXYMETAZOLINE HYDROCHLORIDE 0.05 G/100ML
3 SPRAY NASAL 2 TIMES DAILY
Status: DISCONTINUED | OUTPATIENT
Start: 2021-01-16 | End: 2021-01-17 | Stop reason: HOSPADM

## 2021-01-16 RX ADMIN — Medication 10 ML: at 22:22

## 2021-01-16 RX ADMIN — Medication 10 ML: at 09:00

## 2021-01-16 RX ADMIN — APIXABAN 10 MG: 5 TABLET, FILM COATED ORAL at 22:22

## 2021-01-16 RX ADMIN — Medication 2 PUFF: at 20:00

## 2021-01-16 RX ADMIN — MORPHINE SULFATE 4 MG: 4 INJECTION INTRAVENOUS at 09:15

## 2021-01-16 RX ADMIN — Medication 2 PUFF: at 08:43

## 2021-01-16 RX ADMIN — Medication 3 SPRAY: at 22:23

## 2021-01-16 RX ADMIN — OXYCODONE HYDROCHLORIDE AND ACETAMINOPHEN 1 TABLET: 5; 325 TABLET ORAL at 02:58

## 2021-01-16 RX ADMIN — ONDANSETRON HYDROCHLORIDE 4 MG: 2 INJECTION, SOLUTION INTRAMUSCULAR; INTRAVENOUS at 02:58

## 2021-01-16 RX ADMIN — ESCITALOPRAM OXALATE 20 MG: 10 TABLET ORAL at 09:15

## 2021-01-16 RX ADMIN — ONDANSETRON HYDROCHLORIDE 4 MG: 2 INJECTION, SOLUTION INTRAMUSCULAR; INTRAVENOUS at 23:34

## 2021-01-16 RX ADMIN — Medication 3 SPRAY: at 17:28

## 2021-01-16 RX ADMIN — MORPHINE SULFATE 4 MG: 4 INJECTION INTRAVENOUS at 00:19

## 2021-01-16 RX ADMIN — POTASSIUM CHLORIDE 30 MEQ: 1500 TABLET, EXTENDED RELEASE ORAL at 11:05

## 2021-01-16 RX ADMIN — ONDANSETRON HYDROCHLORIDE 4 MG: 2 INJECTION, SOLUTION INTRAMUSCULAR; INTRAVENOUS at 09:56

## 2021-01-16 RX ADMIN — APIXABAN 10 MG: 5 TABLET, FILM COATED ORAL at 09:15

## 2021-01-16 RX ADMIN — HEPARIN SODIUM 16.9 ML/HR: 10000 INJECTION, SOLUTION INTRAVENOUS at 00:22

## 2021-01-16 ASSESSMENT — PAIN DESCRIPTION - LOCATION
LOCATION: BACK
LOCATION: BACK

## 2021-01-16 ASSESSMENT — PAIN - FUNCTIONAL ASSESSMENT: PAIN_FUNCTIONAL_ASSESSMENT: ACTIVITIES ARE NOT PREVENTED

## 2021-01-16 ASSESSMENT — PAIN DESCRIPTION - FREQUENCY: FREQUENCY: INTERMITTENT

## 2021-01-16 ASSESSMENT — PAIN SCALES - GENERAL
PAINLEVEL_OUTOF10: 0
PAINLEVEL_OUTOF10: 6

## 2021-01-16 ASSESSMENT — PAIN DESCRIPTION - ONSET
ONSET: ON-GOING
ONSET: ON-GOING

## 2021-01-16 ASSESSMENT — PAIN DESCRIPTION - ORIENTATION: ORIENTATION: RIGHT

## 2021-01-16 NOTE — PROGRESS NOTES
High dose Heparin GTT:  APTT at 1831 is 47.9 seconds. Will increase the infusion to 16. 9mL/hr and recheck the aPTT at 0100. Desired aPTT range is 49-76 seconds.   Gildardo Roberts PharmD 1/15/2021 7:03 PM

## 2021-01-16 NOTE — PROGRESS NOTES
EOS report and transfer of care to [facility], RN. Patient resting in bed, stable condition at hand off.

## 2021-01-16 NOTE — PROGRESS NOTES
O2 Sat at rest on room air is 95 %. O2 Sat with activity on room air is 92 %. O2 Sat at rest with oxygen @ 96  lpm is %. O2 Sat with activity with oxygen @ lpm is %. 94    Activity is in bed d/t patient having non weight bearing on L foot orders.

## 2021-01-16 NOTE — PROGRESS NOTES
After completing discharge education patient started to cough and then vomit blood noted. Patient nose started bleeding as she was coughing. Dr. Fish Rea on unit N.O Afrin 3 sprays BID, monitor overnight will revisit tomorrow.

## 2021-01-16 NOTE — FLOWSHEET NOTE
01/16/21 0900   Vital Signs   Temp 99 °F (37.2 °C)   Temp Source Oral   Pulse 80   Heart Rate Source Monitor   Resp 16   /73   BP Location Left upper arm   Patient Position Semi fowlers   Level of Consciousness Alert (0)   MEWS Score 1   Patient Currently in Pain Yes   Pain Assessment   Pain Assessment 0-10   Pain Level 7   Pain Type Acute pain   Pain Location Back   Pain Orientation Right   Pain Descriptors Aching;Discomfort   Patient's Stated Pain Goal No pain   Pain Frequency Intermittent   Pain Onset On-going   Clinical Progression Not changed   Functional Pain Assessment Activities are not prevented   Oxygen Therapy   SpO2 95 %   Pulse Oximeter Device Mode Continuous   Pulse Oximeter Device Location Finger   O2 Device Nasal cannula   O2 Flow Rate (L/min) 1 L/min   Patient Observation   Observations resting in bed   Patient A/O VSS c/o vomiting with some clots. in emesis bag. Pain and nausea med given. Hep gtt stopped and given Eliquis. shift assessment complete see flow sheet

## 2021-01-16 NOTE — PROGRESS NOTES
Patient resting in bed. No further emesis or bloody nose noted. [Fatigue] : fatigue [Cognitive Disturbance: Grade 1 - Mild cognitive disability; not interfering with work/school/life performance; specialized educational services/devices not indicated] : Cognitive Disturbance: Grade 1 - Mild cognitive disability; not interfering with work/school/life performance; specialized educational services/devices not indicated [Visual Disturbances] : no visual disturbances [de-identified] : alopecia [Gait Disturbance] : gait disturbance [Negative] : Eyes [Difficulty Walking] : difficulty walking [Headache: Grade 0] : Headache: Grade 0 [Alopecia: Grade 2 - Hair loss of >=50% normal for that individual that is readily apparent to others; a wig or hair piece is necessary if the patient desires to completely camouflage the hair loss; associated with psychosocial impact] : Alopecia: Grade 2 - Hair loss of >=50% normal for that individual that is readily apparent to others; a wig or hair piece is necessary if the patient desires to completely camouflage the hair loss; associated with psychosocial impact [Concentration Impairment: Grade 2] : Concentration Impairment: Grade 2 - Moderate impairment in attention or decreased level of concentration; limiting instrumental ADL [FreeTextEntry9] : as noted in HPI, uses cane [de-identified] : as noted in HPI

## 2021-01-16 NOTE — DISCHARGE INSTR - COC
Continuity of Care Form    Patient Name: Zeyad John   :  1990  MRN:  [id number]    Admit date:  2021  Discharge date:  2021    Code Status Order: Full Code   Advance Directives:   [facility] Documentation     Date/Time Healthcare Directive Type of Healthcare Directive Copy in [facility] [address] Agent's Name Healthcare Agent's Phone Number    21 0301  No, patient does not have an advance directive for healthcare treatment -- -- -- -- --          Admitting Physician:  Mayo Gabriel MD  PCP: Sarthak Ngo MD    Discharging Nurse: [facility] Unit/Room#: /6723-97  Discharging Unit Phone Number: [phone]    Emergency Contact:   Extended Emergency Contact Information  Primary Emergency Contact: obdulia ortiz  Home Phone: [phone]  Relation: Spouse  Secondary Emergency Contact: mani morales  Home Phone: [phone]  Relation: Brother/Sister    Past Surgical History:  Past Surgical History:   Procedure Laterality Date    ARTHRODESIS Left 2020    SUBTALAR JOINT FUSION, CALCANEO-NAVICULAR COALITION RESECTION LEFT FOOT performed by Modesta Kerr DPM at [address] Left 2020    REPAIR OF MALUNION LEFT FOOT performed by Modesta Kerr DPM at [address] Left     LEEP         Immunization History: There is no immunization history on file for this patient.     Active Problems:  Patient Active Problem List   Diagnosis Code    Multiple subsegmental pulmonary emboli without acute cor pulmonale (HCC) I26.94    COVID-19 U07.1    Pneumonia due to COVID-19 virus U07.1, J12.82    Elevated LFTs R79.89    Leukocytosis D72.829    Morbid obesity ([facility] [address] E66.01       Isolation/Infection:   Isolation          Droplet Plus        Patient Infection Status     Infection Onset Added Last Indicated Last Indicated By Review Planned Expiration Resolved Resolved By    COVID-19 20 01/14/21 12/31/20 COVID-19 01/21/21 01/28/21      Resolved    COVID-19 Rule Out 12/18/20 12/18/20 12/18/20 COVID-19 (Ordered)   12/18/20 Rule-Out Test Resulted    COVID-19 Rule Out 10/30/20 10/30/20 10/30/20 COVID-19 (Ordered)   10/31/20 Rule-Out Test Resulted          Nurse Assessment:  Last Vital Signs: /73   Pulse 80   Temp 99 °F (37.2 °C) (Oral)   Resp 16   Ht 5' 3\" (1.6 m)   Wt 252 lb 8 oz (114.5 kg)   LMP 01/06/2021   SpO2 95%   BMI 44.73 kg/m²     Last documented pain score (0-10 scale): Pain Level: 6  Last Weight:   Wt Readings from Last 1 Encounters:   01/16/21 252 lb 8 oz (114.5 kg)     Mental Status:  alert    IV Access:  { RUIZ IV ACCESS:955986093}    Nursing Mobility/ADLs:  Walking   {CHP DME QHRJ:813224163}  Transfer  {CHP DME VXSF:337263436}  Bathing  {CHP DME YRJW:354366581}  Dressing  {CHP DME BPJK:481696868}  Toileting  {CHP DME DEHM:052897237}  Feeding  {CHP DME TNGM:995454981}  Med Admin  {CHP DME PNYJ:010543723}  Med Delivery   { RUIZ MED Delivery:096272772}    Wound Care Documentation and Therapy:        Elimination:  Continence:   · Bowel: {YES / YY:01739}  · Bladder: {YES / FK:28387}  Urinary Catheter: {Urinary Catheter:335417195}   Colostomy/Ileostomy/Ileal Conduit: {YES / JM:92824}       Date of Last BM: ***    Intake/Output Summary (Last 24 hours) at 1/16/2021 1250  Last data filed at 1/16/2021 0847  Gross per 24 hour   Intake 657 ml   Output 1025 ml   Net -368 ml     I/O last 3 completed shifts:   In: 36 [P.O.:480; I.V.:440]  Out: 1025 [Urine:1025]    Safety Concerns:     [employer] Safety Concerns:805565402}    Impairments/Disabilities:      [employer] Impairments/Disabilities:200822633}    Nutrition Therapy:  Current Nutrition Therapy:   [employer] Diet List:300503475}    Routes of Feeding: {CHP DME Other Feedings:309106153}  Liquids: {Slp liquid thickness:24604}  Daily Fluid Restriction: {CHP DME Yes amt example:320487203}  Last Modified Barium Swallow with Video (Video Swallowing Test): {Done Not Done ZQCU:108916998}    Treatments at the Time of Hospital Discharge:   Respiratory Treatments: ***  Oxygen Therapy:  {Therapy; copd oxygen:18179}  Ventilator:    {[facility] Vent HAIO:686949683}    Rehab Therapies: {THERAPEUTIC INTERVENTION:3379622666}  Weight Bearing Status/Restrictions: {[facility] Weight Bearin}  Other Medical Equipment (for information only, NOT a DME order):  {EQUIPMENT:311476060}  Other Treatments: ***    Patient's personal belongings (please select all that are sent with patient):  {[facility] DME Belongings:204164173}    RN SIGNATURE:  {Esignature:525451077}    CASE MANAGEMENT/SOCIAL WORK SECTION    Inpatient Status Date: ***    Readmission Risk Assessment Score:  Readmission Risk              Risk of Unplanned Readmission:        9           Discharging to Facility/ Agency   · Name:   · Address:  · Phone:  · Fax:    Dialysis Facility (if applicable)   · Name:  · Address:  · Dialysis Schedule:  · Phone:  · Fax:    / signature: {Esignature:397338224}    PHYSICIAN SECTION    Prognosis: {Prognosis:3988985167}    Condition at Discharge: [address] [place] [address] Patient Condition:938055607}    Rehab Potential (if transferring to Rehab): {Prognosis:6817055159}    Recommended Labs or Other Treatments After Discharge: ***    Physician Certification: I certify the above information and transfer of Syed Roberson  is necessary for the continuing treatment of the diagnosis listed and that she requires {Admit to Appropriate Level of Care:10856} for {GREATER/LESS:775367599} 30 days.      Update Admission H&P: {CHP DME Changes in LVYFQ:429509765}    PHYSICIAN SIGNATURE:  {Esignature:915266353}

## 2021-01-16 NOTE — PROGRESS NOTES
Assessment complete as per flow sheets. VSS. Patient is A/O x 4. Unlabored breathing at rest on room air. Normal sinus rhythm on cardiac monitor. Bowel sounds + x4 quads. Patient c/o rib pain. PRN medication and comfort measures provided. Patient voids per cabrera catheter. Site is patent and secured via statlock. UO is yellow & cloudy in appearance. PIV appears WNL. Dressing is C/D/I. Heparin gtt infusing as per MAR. Discussed POC, labs, testing, medical equipment and unit routine. Answered questions at this time. Meds as per MAR. Safety measures in place and will continue to monitor.

## 2021-01-16 NOTE — PROGRESS NOTES
Called and spoke with Fuad Rodriguez at [facility], no starter packs in stock but can give the patient loose tablets. Co-pay is $20.00 and patient's nurse [address] made aware.

## 2021-01-16 NOTE — CONSULTS
High dose heparin drip  APTT drawn at 0250 1/16/21. Will increase heparin infusion to 17.9 ml/hr at 0445 and recheck aPTT at 1100. Desired aPTT range is 49-76.   Eula Garcia [facility] 1/16/2021 4:49 AM

## 2021-01-16 NOTE — PROGRESS NOTES
Pulmonary Progress Note  CC: shortness of breath, chest pain    Subjective:   No coughing or throwing up blood, still with some pleuritic pain     IV line peripheral     EXAM:   /71   Pulse 83   Temp 98.2 °F (36.8 °C) (Oral)   Resp 16   Ht 5' 3\" (1.6 m)   Wt 252 lb 8 oz (114.5 kg)   LMP 01/06/2021   SpO2 94%   BMI 44.73 kg/m²  on RA  Constitutional:  No acute distress   HEENT: no scleral icterus  Neck: No tracheal deviation present. Cardiovascular: Normal heart sounds. Pulmonary/Chest: No wheezes. No rhonchi. Few basilar rales. No decreased breath sounds. No accessory muscle usage or stridor. Abdominal: Soft. Musculoskeletal: No cyanosis. No clubbing. Skin: Skin is warm and dry.      Scheduled Meds:   fluticasone  2 puff Inhalation BID    escitalopram  20 mg Oral Daily    sodium chloride flush  10 mL Intravenous 2 times per day     Continuous Infusions:   heparin (PORCINE) Infusion 17.9 mL/hr (01/16/21 0543)     PRN Meds:  cyclobenzaprine, oxyCODONE-acetaminophen, sodium chloride flush, promethazine **OR** ondansetron, polyethylene glycol, acetaminophen **OR** acetaminophen, morphine, heparin (porcine), heparin (porcine)    Labs:  CBC:   Recent Labs     01/13/21 2035 01/14/21 0438 01/14/21  1221 01/15/21  0459 01/16/21  0624   WBC 14.7* 14.4*  --   --  7.6   HGB 13.9 12.8 13.4 12.4 11.9*   HCT 42.3 39.2 42.0 38.2 38.0   MCV 80.7 82.0  --   --  85.6    307  --   --  218     BMP:   Recent Labs     01/13/21 2035 01/14/21 0438 01/16/21  0624    135* 132*   K 3.8 3.6 3.2*    100 96*   CO2 23 22 24   BUN 11 11 8   CREATININE 0.8 0.8 0.6       Cultures:  12/29/20 COVID positive     Films:  CTPA 1/14/21  Pulmonary Arteries: Filling defects are noted within segmental branches of   bilateral lower lobes compatible with acute pulmonary emboli.  No central   pulmonary embolism.  Normal caliber main pulmonary artery.       Mediastinum: Normal heart size.  No pericardial effusion.  No evidence of   right heart strain.  No mediastinal, hilar, or axillary lymphadenopathy. Normal caliber thoracic aorta.       Lungs/pleura: Lungs demonstrate patchy ground-glass airspace consolidation   within the lower lobes with additional areas of linear scarring or   atelectasis elsewhere in the lungs compatible with pneumonia and sequelae of   pneumonia.  No effusion or pneumothorax.       Upper Abdomen: Is otherwise within normal limits.       Soft Tissues/Bones: No acute bone or soft tissue abnormality.           Impression   Couple acute segmental pulmonary emboli within the lower lobes.       Multifocal consolidative changes within the lower lungs compatible with   pneumonia with additional areas of scarring or atelectasis.       Fatty liver.      ASSESSMENT:  · - COVID-19 isolation, droplet plus  · Acute pulmonary embolism, provoked by recent SARS-CoV-2 infection   · COVID-19, diagnosed 12/29/20  · Right greater than left bibasilar infiltrates, CAP v COVID pneumonia - clinical history, imaging and low procalcitonin all suggest viral etiology   · Single episode hematemesis & single episode hemoptysis - hemoptysis is likely 2/2 PE or recent COVID pneumonia   · Nausea, possibly explained by lower lobe PE, but other causes should be considered  · Fever -resolved  · Fatty liver on CT     PLAN:  Supplemental oxygen to keep oxygen saturation greater than or equal to 92%  Full anticoagulation for 3 months per ACCP guidelines for provoked PE.  D/C heparin and change to eliquis  D/C planner to make sure insurance covers med  Okay with me for home - f/u can be with PCP

## 2021-01-16 NOTE — PROGRESS NOTES
Progress Note    Admit Date:  1/13/2021  Admitted for bilateral PEs     Subjective:  Ms. Nitin Clark states pain in controlled. Had epistaxis   Vomited  Small amount of blood this am        Objective:   Vitals:    01/16/21 0900   BP: 107/73   Pulse: 80   Resp: 16   Temp: 99 °F (37.2 °C)   SpO2: 95%         Intake/Output Summary (Last 24 hours) at 1/16/2021 1618  Last data filed at 1/16/2021 1300  Gross per 24 hour   Intake 897 ml   Output 675 ml   Net 222 ml       Physical Exam:    Gen: No distress. Alert. Eyes: PERRL. No sclera icterus. No conjunctival injection. ENT: No discharge. Pharynx clear. Neck: No JVD. Trachea midline. Resp: No accessory muscle use. No crackles. No wheezes. No rhonchi. LS diminished. CV: Regular rate. Regular rhythm. No murmur. No rub. No edema. Capillary Refill: Brisk,< 3 seconds   Peripheral Pulses: +2 palpable, equal bilaterally   GI: Non-tender. Non-distended. Normal bowel sounds. Skin: Warm and dry. No nodule on exposed extremities. No rash on exposed extremities. M/S: No cyanosis. No joint deformity. No clubbing. Neuro: Awake. Grossly nonfocal    Psych: Oriented x 3. No anxiety or agitation.        Scheduled Meds:   apixaban  10 mg Oral BID    [START ON 1/23/2021] apixaban  5 mg Oral BID    oxymetazoline  3 spray Each Nostril BID    fluticasone  2 puff Inhalation BID    escitalopram  20 mg Oral Daily    sodium chloride flush  10 mL Intravenous 2 times per day       Continuous Infusions:      PRN Meds:  cyclobenzaprine, oxyCODONE-acetaminophen, sodium chloride flush, promethazine **OR** ondansetron, polyethylene glycol, acetaminophen **OR** acetaminophen, morphine      Data:  CBC:   Recent Labs     01/13/21 2035 01/14/21  0438 01/14/21  1221 01/15/21  0459 01/16/21  0624   WBC 14.7* 14.4*  --   --  7.6   HGB 13.9 12.8 13.4 12.4 11.9*   HCT 42.3 39.2 42.0 38.2 38.0   MCV 80.7 82.0  --   --  85.6    307  --   --  218     BMP:   Recent Labs     01/13/21 2035 01/14/21  0438 01/16/21  0624    135* 132*   K 3.8 3.6 3.2*    100 96*   CO2 23 22 24   BUN 11 11 8   CREATININE 0.8 0.8 0.6     LIVER PROFILE:   Recent Labs     01/13/21 2035   AST 16   ALT 47*   BILITOT 0.8   ALKPHOS 135*     CULTURES  Urine Cx: Negative      RADIOLOGY  CT CHEST PULMONARY EMBOLISM W CONTRAST   Final Result   Couple acute segmental pulmonary emboli within the lower lobes. Multifocal consolidative changes within the lower lungs compatible with   pneumonia with additional areas of scarring or atelectasis. Fatty liver. Critical results were called by Dr. Martha Nicole to [facility] on   1/13/2021 at 22:16. XR CHEST PORTABLE   Preliminary Result   1. Evaluation is significantly limited due to low lung volumes. 2. Airspace opacities in the mid to lower lung zone which may represent   atelectasis or infection. Assessment/Plan:  Sepsis due to COVID (POA--Leukocytosis, tachycardia, febrile, source)   - Due to COVID PNA  - No Abx required   - Monitor symptoms   - BP stable, no need for pressors     Acute bilateral PEs  -With recent foot surgery as well as Covid  -Continue full dose anticoagulation with Lovenox, will need to transition to PO-->with hematemesis 1/14.  Very small amount of hemoptysis on 1/15, continued heparin gtt and transitioned  to eliquis    -No evidence of acute right heart strain, troponin within normal limits  -Pulmonology consulted   - PRN pain control     COVID-19 pneumonia  -CT shows multifocal consolidative changes in the lower lungs compatible with pneumonia  -+ Covid on 12/31  -Droplet plus precautions  -No acute associated hypoxia, no indication for Decadron, remdesivir or CCP at this time     Elevated LFTs  -Suspect secondary to COVID infection  -CT scan does show fatty liver  -Recommend weight loss     Leukocytosis  -Possible UTI, urine culture pending  - Denies any symptoms   -Check procalcitonin WNL       Recent surgery  -Status post repair of malunion of left foot with Dr. Diego Lord on 12/23     Anxiety  Depression  -Continue home medications     Morbid Obesity  - Body mass index is 44.64 kg/m². - Complicating assessment and treatment. Placing patient at risk for multiple co-morbidities as well as early death and contributing to the patient's presentation.   - Counseled on weight loss. DVT Prophylaxis: heparin gtt  Diet: DIET GENERAL;  Code Status: Full Code    MITALI Saini.

## 2021-01-17 VITALS
BODY MASS INDEX: 44.84 KG/M2 | WEIGHT: 253.1 LBS | OXYGEN SATURATION: 94 % | HEIGHT: 63 IN | TEMPERATURE: 97.6 F | SYSTOLIC BLOOD PRESSURE: 115 MMHG | DIASTOLIC BLOOD PRESSURE: 75 MMHG | RESPIRATION RATE: 18 BRPM | HEART RATE: 69 BPM

## 2021-01-17 LAB
ANION GAP SERPL CALCULATED.3IONS-SCNC: 12 MMOL/L (ref 3–16)
BUN BLDV-MCNC: 10 MG/DL (ref 7–20)
CALCIUM SERPL-MCNC: 9.5 MG/DL (ref 8.3–10.6)
CHLORIDE BLD-SCNC: 98 MMOL/L (ref 99–110)
CO2: 27 MMOL/L (ref 21–32)
CREAT SERPL-MCNC: 0.7 MG/DL (ref 0.6–1.1)
GFR AFRICAN AMERICAN: >60
GFR NON-AFRICAN AMERICAN: >60
GLUCOSE BLD-MCNC: 115 MG/DL (ref 70–99)
HCT VFR BLD CALC: 35.5 % (ref 36–48)
HEMOGLOBIN: 11.7 G/DL (ref 12–16)
MCH RBC QN AUTO: 26.8 PG (ref 26–34)
MCHC RBC AUTO-ENTMCNC: 33 G/DL (ref 31–36)
MCV RBC AUTO: 81.3 FL (ref 80–100)
PDW BLD-RTO: 14.2 % (ref 12.4–15.4)
PLATELET # BLD: 331 K/UL (ref 135–450)
PMV BLD AUTO: 7.9 FL (ref 5–10.5)
POTASSIUM SERPL-SCNC: 3.2 MMOL/L (ref 3.5–5.1)
RBC # BLD: 4.37 M/UL (ref 4–5.2)
SODIUM BLD-SCNC: 137 MMOL/L (ref 136–145)
WBC # BLD: 8.3 K/UL (ref 4–11)

## 2021-01-17 PROCEDURE — 85027 COMPLETE CBC AUTOMATED: CPT

## 2021-01-17 PROCEDURE — 99232 SBSQ HOSP IP/OBS MODERATE 35: CPT | Performed by: INTERNAL MEDICINE

## 2021-01-17 PROCEDURE — 80048 BASIC METABOLIC PNL TOTAL CA: CPT

## 2021-01-17 PROCEDURE — 99239 HOSP IP/OBS DSCHRG MGMT >30: CPT | Performed by: INTERNAL MEDICINE

## 2021-01-17 PROCEDURE — 6370000000 HC RX 637 (ALT 250 FOR IP): Performed by: INTERNAL MEDICINE

## 2021-01-17 PROCEDURE — 6370000000 HC RX 637 (ALT 250 FOR IP): Performed by: HOSPITALIST

## 2021-01-17 PROCEDURE — 94640 AIRWAY INHALATION TREATMENT: CPT

## 2021-01-17 PROCEDURE — 36415 COLL VENOUS BLD VENIPUNCTURE: CPT

## 2021-01-17 PROCEDURE — 2580000003 HC RX 258: Performed by: HOSPITALIST

## 2021-01-17 RX ORDER — POTASSIUM CHLORIDE 20 MEQ/1
20 TABLET, EXTENDED RELEASE ORAL ONCE
Status: COMPLETED | OUTPATIENT
Start: 2021-01-17 | End: 2021-01-17

## 2021-01-17 RX ADMIN — OXYCODONE HYDROCHLORIDE AND ACETAMINOPHEN 1 TABLET: 5; 325 TABLET ORAL at 02:42

## 2021-01-17 RX ADMIN — Medication 10 ML: at 08:37

## 2021-01-17 RX ADMIN — ESCITALOPRAM OXALATE 20 MG: 10 TABLET ORAL at 08:37

## 2021-01-17 RX ADMIN — APIXABAN 10 MG: 5 TABLET, FILM COATED ORAL at 08:37

## 2021-01-17 RX ADMIN — POTASSIUM CHLORIDE 20 MEQ: 1500 TABLET, EXTENDED RELEASE ORAL at 13:47

## 2021-01-17 RX ADMIN — Medication 3 SPRAY: at 08:37

## 2021-01-17 RX ADMIN — OXYCODONE HYDROCHLORIDE AND ACETAMINOPHEN 1 TABLET: 5; 325 TABLET ORAL at 08:53

## 2021-01-17 RX ADMIN — Medication 2 PUFF: at 07:23

## 2021-01-17 ASSESSMENT — PAIN SCALES - GENERAL: PAINLEVEL_OUTOF10: 8

## 2021-01-17 ASSESSMENT — PAIN DESCRIPTION - DIRECTION: RADIATING_TOWARDS: NON

## 2021-01-17 ASSESSMENT — PAIN DESCRIPTION - ORIENTATION: ORIENTATION: RIGHT;POSTERIOR

## 2021-01-17 ASSESSMENT — PAIN DESCRIPTION - PAIN TYPE: TYPE: ACUTE PAIN

## 2021-01-17 NOTE — PROGRESS NOTES
AM assessment completed, see flowsheet. Patient resting in bed at this time. All needs met. Respirations easy and even at rest. C/o pain at this time, PRN Percocet given per order. Bed in lowest position and locked, SR up x2. Call light and bedside table within reach.

## 2021-01-17 NOTE — PROGRESS NOTES
24 27   BUN 8 10   CREATININE 0.6 0.7     LIVER PROFILE:   No results for input(s): AST, ALT, LIPASE, BILIDIR, BILITOT, ALKPHOS in the last 72 hours. Invalid input(s): AMYLASE,  ALB  CULTURES  Urine Cx: Negative      RADIOLOGY  CT CHEST PULMONARY EMBOLISM W CONTRAST   Final Result   Couple acute segmental pulmonary emboli within the lower lobes. Multifocal consolidative changes within the lower lungs compatible with   pneumonia with additional areas of scarring or atelectasis. Fatty liver. Critical results were called by Dr. Ana Rosa Huang to [facility] on   1/13/2021 at 22:16. XR CHEST PORTABLE   Preliminary Result   1. Evaluation is significantly limited due to low lung volumes. 2. Airspace opacities in the mid to lower lung zone which may represent   atelectasis or infection. Assessment/Plan:  Sepsis due to COVID (POA--Leukocytosis, tachycardia, febrile, source)   - Due to COVID PNA  - No Abx required   - Monitor symptoms   - BP stable, no need for pressors     Acute bilateral PEs  -With recent foot surgery as well as Covid  -Continue full dose anticoagulation with Lovenox, will need to transition to PO-->with hematemesis 1/14.  Very small amount of hemoptysis on 1/15, continued heparin gtt and transitioned  to eliquis    -No evidence of acute right heart strain, troponin within normal limits  -Pulmonology consulted   - PRN pain control  No hemoptysis today     COVID-19 pneumonia  -CT shows multifocal consolidative changes in the lower lungs compatible with pneumonia  -+ Covid on 12/31  -Droplet plus precautions  -No acute associated hypoxia, no indication for Decadron, remdesivir or CCP at this time     Elevated LFTs  -Suspect secondary to COVID infection  -CT scan does show fatty liver  -Recommend weight loss     Leukocytosis  -Possible UTI, urine culture pending  - Denies any symptoms   -Check procalcitonin WNL       Recent surgery  -Status post repair of malunion of left foot with Dr. Maryjane Rubinstein on 12/23     Anxiety  Depression  -Continue home medications     Morbid Obesity  - Body mass index is 44.64 kg/m². - Complicating assessment and treatment. Placing patient at risk for multiple co-morbidities as well as early death and contributing to the patient's presentation.   - Counseled on weight loss. DVT Prophylaxis: heparin gtt  Diet: DIET GENERAL;  Code Status: Full Code    MITLAI Saini.

## 2021-01-17 NOTE — PROGRESS NOTES
Pulmonary Progress Note  CC: shortness of breath, chest pain    Subjective: Single episode of epistaxis, which she tells me she has had some episodes in the past.  It has now stopped. She brought up a small amount of blood, dark, with a single episode of emesis yesterday. Nausea is much better today. She feels much better today. She thinks she is ready to go home today. IV line peripheral     EXAM:   /75   Pulse 69   Temp 97.6 °F (36.4 °C) (Oral)   Resp 18   Ht 5' 3\" (1.6 m)   Wt 253 lb 1.6 oz (114.8 kg)   LMP 01/06/2021   SpO2 94%   BMI 44.83 kg/m²  on RA  Constitutional:  No acute distress   HEENT: no scleral icterus  Neck: No tracheal deviation present. Cardiovascular: Normal heart sounds. Pulmonary/Chest: No wheezes. No rhonchi. Few basilar rales. No decreased breath sounds. No accessory muscle usage or stridor. Abdominal: Soft. Musculoskeletal: No cyanosis. No clubbing. Skin: Skin is warm and dry.      Scheduled Meds:   apixaban  10 mg Oral BID    [START ON 1/23/2021] apixaban  5 mg Oral BID    oxymetazoline  3 spray Each Nostril BID    fluticasone  2 puff Inhalation BID    escitalopram  20 mg Oral Daily    sodium chloride flush  10 mL Intravenous 2 times per day     Continuous Infusions:    PRN Meds:  cyclobenzaprine, oxyCODONE-acetaminophen, sodium chloride flush, promethazine **OR** ondansetron, polyethylene glycol, acetaminophen **OR** acetaminophen, morphine    Labs:  CBC:   Recent Labs     01/15/21  0459 01/16/21  0624 01/17/21  0423   WBC  --  7.6 8.3   HGB 12.4 11.9* 11.7*   HCT 38.2 38.0 35.5*   MCV  --  85.6 81.3   PLT  --  218 331     BMP:   Recent Labs     01/16/21  0624 01/17/21  0423   * 137   K 3.2* 3.2*   CL 96* 98*   CO2 24 27   BUN 8 10   CREATININE 0.6 0.7       Cultures:  12/29/20 COVID positive     Films:  CTPA 1/14/21  Pulmonary Arteries: Filling defects are noted within segmental branches of   bilateral lower lobes compatible with acute

## 2021-01-17 NOTE — PROGRESS NOTES
Assessment complete as per flow sheets. VSS. Patient is A/O x 4. Unlabored breathing at rest on room air. Normal sinus rhythm on cardiac monitor. Bowel sounds + x4 quads. Patient denies pain. Comfort measures provided. Patient voids per purewick external cath. No issues noted. PIV appears WNL. Dressing is C/D/I. Discussed POC, labs, testing, medical equipment and unit routine. Answered questions at this time. Meds as per MAR. Safety measures in place and will continue to monitor.

## 2021-01-17 NOTE — PROGRESS NOTES
Pt resting in bed at this time. A/O. No s/s of pain or distress. All needs met and call light within reach.

## 2021-01-18 ENCOUNTER — CARE COORDINATION (OUTPATIENT)
Dept: CASE MANAGEMENT | Age: 31
End: 2021-01-18

## 2021-01-18 NOTE — DISCHARGE SUMMARY
Name:  Jennifer Yarbrough  Room:  /1779-90  MRN:    [id number]    Discharge Summary      This discharge summary is in conjunction with a complete physical exam done on the day of discharge. Discharging Physician: Dr. Sharif Base: 1/13/2021  Discharge:  1/17/2021    HPI:  The patient is a 26 y.o. female with asthma, anxiety and depression, vitamin D deficiency who presented to [facility] ED with complaint of rib pain. Patient reports that she had a foot surgery on 12/23 and was recovering well but started with URI symptoms and was dx with COVID on 12/31 by a drive throughout COVID testing site. She reports that her cough and symptoms were improving until recently when she woke up with pain in both of her ribs. She came to the ER for the rib pain and was dx with bilateral PEs.    She denies any chest pain but has continued left sided rib pain. Was on supplemental O2 on admission but has since be weaned off.      Patient did have one episode of hematemesis. Patient report she suddenly became very nauseous and vomiting with bright red blood and blood clots per the nurse. Patient has never had anything like this before and denies any associated abdominal pain. Diagnoses this Admission and Hospital Course   Sepsis due to COVID (POA--Leukocytosis, tachycardia, febrile, source)   - Due to COVID PNA  - No Abx required   - Monitored symptoms   - BP stable, no need for pressors      Acute bilateral PEs  -With recent foot surgery as well as Covid  -Continued full dose anticoagulation with Lovenox, with hematemesis 1/14.  Very small amount of hemoptysis on 1/15, continued heparin gtt and transitioned  to eliquis    -No evidence of acute right heart strain, troponin within normal limits  -Pulmonology consulted   - PRN pain control  No hemoptysis today  D/c on Eliquis.      COVID-19 pneumonia  -CT shows multifocal consolidative changes in the lower lungs compatible with pneumonia  -+ Covid on 12/31  -Droplet plus precautions  -No acute associated hypoxia, no indication for Decadron, remdesivir or CCP at this time     Elevated LFTs  -Suspect secondary to COVID infection  -CT scan does show fatty liver  -Recommend weight loss     Leukocytosis  -Possible UTI, urine culture mixed minna  - Denies any symptoms   -Check procalcitonin WNL     Recent surgery  -Status post repair of malunion of left foot with Dr. Diego Lord on 12/23     Anxiety  Depression  -Continued home medications     Morbid Obesity  - Body mass index is 44.64 kg/m². - Complicating assessment and treatment.  Placing patient at risk for multiple co-morbidities as well as early death and contributing to the patient's presentation.   - Counseled on weight loss.     DVT Prophylaxis: heparin gtt--> Eliquis    Procedures (Please Review Full Report for Details)  N/A    Consults    Pulmonology       Physical Exam at Discharge:    /75   Pulse 69   Temp 97.6 °F (36.4 °C) (Oral)   Resp 18   Ht 5' 3\" (1.6 m)   Wt 253 lb 1.6 oz (114.8 kg)   LMP 01/06/2021   SpO2 94%   BMI 44.83 kg/m²     See today's progress note    CBC:   Recent Labs     01/16/21  0624 01/17/21  0423   WBC 7.6 8.3   HGB 11.9* 11.7*   HCT 38.0 35.5*   MCV 85.6 81.3    331     BMP:   Recent Labs     01/16/21  0624 01/17/21  0423   * 137   K 3.2* 3.2*   CL 96* 98*   CO2 24 27   BUN 8 10   CREATININE 0.6 0.7     APTT:   Recent Labs     01/15/21  1831 01/16/21  0101 01/16/21  0250   APTT 47.9* 44.8* 46.1*       U/A:    Lab Results   Component Value Date    COLORU DK YELLOW 01/15/2021    WBCUA 3-5 01/15/2021    RBCUA 0-2 01/15/2021    MUCUS 3+ 01/15/2021    BACTERIA 3+ 01/13/2021    CLARITYU Clear 01/15/2021    SPECGRAV >=1.030 01/15/2021    LEUKOCYTESUR Negative 01/15/2021    BLOODU Negative 01/15/2021    GLUCOSEU Negative 01/15/2021    GLUCOSEU Negative 05/07/2012    AMORPHOUS 3+ 01/15/2021       CULTURES  Urine Cx: Negative     RADIOLOGY  CT CHEST PULMONARY EMBOLISM W CONTRAST   Final Result   Couple acute segmental pulmonary emboli within the lower lobes. Multifocal consolidative changes within the lower lungs compatible with   pneumonia with additional areas of scarring or atelectasis. Fatty liver. Critical results were called by Dr. Mia Sepulveda to [facility] on   1/13/2021 at 22:16. XR CHEST PORTABLE   Preliminary Result   1. Evaluation is significantly limited due to low lung volumes. 2. Airspace opacities in the mid to lower lung zone which may represent   atelectasis or infection. Discharge Medications     Medication List      START taking these medications    apixaban starter pack 5 MG Tbpk tablet  Commonly known as: ELIQUIS  Take 1 tablet by mouth See Admin Instructions As directed        CHANGE how you take these medications    oxyCODONE-acetaminophen 5-325 MG per tablet  Commonly known as: PERCOCET  Take 1 tablet by mouth every 6 hours as needed for Pain for up to 4 days.   What changed: when to take this        CONTINUE taking these medications    BUPROPION HCL PO     cyclobenzaprine 10 MG tablet  Commonly known as: FLEXERIL     Lexapro 20 MG tablet  Generic drug: escitalopram     ondansetron 4 MG tablet  Commonly known as: ZOFRAN  Take 1 tablet by mouth daily as needed for Nausea or Vomiting     Qvar RediHaler 80 MCG/ACT Aerb inhaler  Generic drug: beclomethasone        STOP taking these medications    ondansetron 4 MG disintegrating tablet  Commonly known as: Zofran ODT           Where to Get Your Medications      These medications were sent to [address], [address] [place] [address] [id number] [facility] [address]    Phone: [phone]   · oxyCODONE-acetaminophen 5-325 MG per tablet     You can get these medications from any pharmacy    Bring a paper prescription for each of these medications  · apixaban starter pack 5 MG Tbpk tablet           Discharged in stable

## 2021-01-18 NOTE — CARE COORDINATION
Nah[facility] Initial Outreach    Call within 2 business days of discharge: Yes    Patient: Maxim Shipley Patient : 1990   MRN: [id number]  Discharge Date: 21   RARS: Readmission Risk Score: 10      Last Discharge [address]       Complaint Diagnosis Description Type Department Provider    21 Rib Pain Multiple subsegmental pulmonary emboli without acute cor pulmonale ([facility] [address] . .. ED to Hosp-Admission (Discharged) (ADMITTED) Jennifer Doan MD; Roxana Stiles. .. Patient contacted regarding COVID-19 diagnosis. COVID-19 Detected on 2020.     1st attempt - Unable to reach patient by phone. Message left stating purpose of call with contact information requesting return call. Follow Up  No future appointments.     Sean Burk RN  Care Transitions Nurse  [phone] mobile

## 2021-01-19 ENCOUNTER — CARE COORDINATION (OUTPATIENT)
Dept: CASE MANAGEMENT | Age: 31
End: 2021-01-19

## 2021-01-19 NOTE — CARE COORDINATION
Nah[facility] Initial Outreach    Call within 2 business days of discharge: Yes    Patient: Mily Borges Patient : 1990   MRN: [id number]  Discharge Date: 21   RARS: Readmission Risk Score: 10      Last Discharge [id number]       Complaint Diagnosis Description Type Department Provider    21 Rib Pain Multiple subsegmental pulmonary emboli without acute cor pulmonale ([facility] [address] . .. ED to Hosp-Admission (Discharged) (ADMITTED) Colin Huang MD; Rudolm Nissen. .. Patient contacted regarding COVID-19 diagnosis. COVID-19 Detected on 2020.      2nd attempt - Unable to reach patient by phone. Message left stating purpose of call with contact information requesting return call. No further CTN outreach scheduled. Episode resolved at this time. Follow Up  No future appointments.     Segun Phillip RN  Care Transitions Nurse  [phone] mobile

## 2021-02-04 ENCOUNTER — HOSPITAL ENCOUNTER (OUTPATIENT)
Dept: VASCULAR LAB | Age: 31
Discharge: HOME | End: 2021-02-04
Payer: COMMERCIAL

## 2021-02-04 PROCEDURE — 93970 EXTREMITY STUDY: CPT

## 2021-05-26 ENCOUNTER — HOSPITAL ENCOUNTER (EMERGENCY)
Age: 31
Discharge: HOME | End: 2021-05-26
Payer: COMMERCIAL

## 2021-05-26 ENCOUNTER — APPOINTMENT (OUTPATIENT)
Dept: GENERAL RADIOLOGY | Age: 31
End: 2021-05-26
Payer: COMMERCIAL

## 2021-05-26 VITALS
HEART RATE: 88 BPM | OXYGEN SATURATION: 97 % | RESPIRATION RATE: 16 BRPM | HEIGHT: 63 IN | TEMPERATURE: 97.9 F | DIASTOLIC BLOOD PRESSURE: 80 MMHG | SYSTOLIC BLOOD PRESSURE: 121 MMHG | BODY MASS INDEX: 44.3 KG/M2 | WEIGHT: 250 LBS

## 2021-05-26 DIAGNOSIS — M79.672 LEFT FOOT PAIN: Primary | ICD-10-CM

## 2021-05-26 DIAGNOSIS — Z98.890 S/P FOOT SURGERY, LEFT: ICD-10-CM

## 2021-05-26 PROCEDURE — 99284 EMERGENCY DEPT VISIT MOD MDM: CPT

## 2021-05-26 PROCEDURE — 73630 X-RAY EXAM OF FOOT: CPT

## 2021-05-26 ASSESSMENT — PAIN SCALES - GENERAL: PAINLEVEL_OUTOF10: 8

## 2021-05-26 NOTE — ED PROVIDER NOTES
[address] ED  EMERGENCY DEPARTMENT ENCOUNTER        Pt Name: Kelley Mishra  MRN: [id number]  [place] 1990  Date of evaluation: 5/26/2021  Provider: Maryse Light PA-C  PCP: Solmon Oppenheim, MD    Shared Visit or Autonomous Visit: SOFIA. I have evaluated this patient. My supervising physician was available for consultation. CHIEF COMPLAINT       Chief Complaint   Patient presents with    Foot Pain     Pt reports Hx of left foot surgery, pt reports for the past 3 weeks pain has been increasing, pt concerned hardware may have displaced. HISTORY OF PRESENT ILLNESS   (Location/Symptom, Timing/Onset, Context/Setting, Quality, Duration, Modifying Factors, Severity)  Note limiting factors. Kelley Mishra is a 33 y.o. female presenting to the emergency department for evaluation of left foot pain for the past 3 weeks. She had foot surgery 11/4 Dr. Wilson Rdz states that the screw ended up going through the bone and they had to do another surgery on 12/23 states she had recently gotten back to work and now with bearing weight stepping down having pain in her foot thinks the screw may have slipped through the bone again. No wounds. No redness. The history is provided by the patient. Foot Problem  Location:  Foot  Time since incident:  3 weeks  Injury: no    Foot location:  L foot  Pain details:     Timing:  Constant    Progression:  Worsening  Associated symptoms: no fever        Nursing Notes were reviewed    REVIEW OF SYSTEMS    (2-9 systems for level 4, 10 or more for level 5)     Review of Systems   Constitutional: Negative for fever. Gastrointestinal: Negative for vomiting. Musculoskeletal:        Left foot pain   Skin: Negative for color change. Positives and Pertinent negatives as per HPI.        PAST MEDICAL HISTORY     Past Medical History:   Diagnosis Date    Anxiety     Asthma     COVID-19 12/31/2020    Depression     Kidney stone     Vitamin D deficiency SURGICAL HISTORY     Past Surgical History:   Procedure Laterality Date    ARTHRODESIS Left 2020    SUBTALAR JOINT FUSION, CALCANEO-NAVICULAR COALITION RESECTION LEFT FOOT performed by Meri Borjas DPM at [address] Left 2020    REPAIR OF MALUNION LEFT FOOT performed by Meri Borjas DPM at [address] Left     LEEP           [address]       Discharge Medication List as of 2021  8:11 PM      CONTINUE these medications which have NOT CHANGED    Details   apixaban starter pack (ELIQUIS) 5 MG TBPK tablet Take 1 tablet by mouth See Admin Instructions As directed, Disp-74 tablet, R-0Print      BUPROPION HCL PO Take by mouthHistorical Med      ondansetron (ZOFRAN) 4 MG tablet Take 1 tablet by mouth daily as needed for Nausea or Vomiting, Disp-15 tablet, R-0Print      cyclobenzaprine (FLEXERIL) 10 MG tablet Take 10 mg by mouth 3 times daily as needed for Muscle spasmsHistorical Med      escitalopram (LEXAPRO) 20 MG tablet Take 20 mg by mouth dailyHistorical Med      beclomethasone (QVAR REDIHALER) 80 MCG/ACT AERB inhaler Inhale 1 puff into the lungs 2 times dailyHistorical Med               ALLERGIES     Patient has no known allergies. FAMILYHISTORY     History reviewed. No pertinent family history.        SOCIAL HISTORY       Social History     Socioeconomic History    Marital status:      Spouse name: None    Number of children: None    Years of education: None    Highest education level: None   Occupational History    None   Tobacco Use    Smoking status: Former Smoker     Packs/day: 0.50     Years: 10.00     Pack years: 5.00     Types: Cigarettes     Quit date: 10/10/2013     Years since quittin.6    Smokeless tobacco: Never Used   Substance and Sexual Activity    Alcohol use: Yes     Comment: rare    Drug use: No    Sexual activity: None   Other Topics Concern    None   Social History Narrative    None Social Determinants of Health     Financial Resource Strain:     Difficulty of Paying Living Expenses:    Food Insecurity:     Worried About Running Out of Food in the Last Year:     [address] in the Last Year:    Transportation Needs:     Lack of Transportation (Medical):  Lack of Transportation (Non-Medical):    Physical Activity:     Days of Exercise per Week:     Minutes of Exercise per Session:    Stress:     Feeling of Stress :    Social Connections:     Frequency of Communication with Friends and Family:     Frequency of Social Gatherings with Friends and Family:     Attends Sikhism Services:     Active Member of Clubs or Organizations:     Attends Club or Organization Meetings:     Marital Status:    Intimate Partner Violence:     Fear of Current or Ex-Partner:     Emotionally Abused:     Physically Abused:     Sexually Abused:        SCREENINGS    Bladimir Coma Scale  Eye Opening: Spontaneous  Best Verbal Response: Oriented  Best Motor Response: Obeys commands  [place] Coma Scale Score: 15        PHYSICAL EXAM    (up to 7 for level 4, 8 or more for level 5)     ED Triage Vitals [05/26/21 1848]   BP Temp Temp Source Pulse Resp SpO2 Height Weight   121/80 97.9 °F (36.6 °C) Infrared 88 16 97 % 5' 3\" (1.6 m) 250 lb (113.4 kg)       Physical Exam  Vitals and nursing note reviewed. Constitutional:       Appearance: She is well-developed. She is not ill-appearing or toxic-appearing. HENT:      Head: Normocephalic and atraumatic. Cardiovascular:      Pulses: Normal pulses. Dorsalis pedis pulses are 2+ on the left side. Posterior tibial pulses are 2+ on the left side. Pulmonary:      Effort: Pulmonary effort is normal. No respiratory distress. Feet:      Comments: Surgical scars left foot. Mild tenderness to palpation plantar aspect of her left foot. Swelling. No erythema. No open wounds. Intact sensation. Distal pulses 2+.   Cap refill less than 2 seconds. Wiggling toes. Skin:     General: Skin is warm and dry. Capillary Refill: Capillary refill takes less than 2 seconds. Neurological:      Mental Status: She is alert and oriented to person, place, and time. Sensory: Sensation is intact. Motor: Motor function is intact. No abnormal muscle tone. Psychiatric:         Behavior: Behavior normal.         DIAGNOSTIC RESULTS   LABS:    Labs Reviewed - No data to display    All other labs were within normal range or not returned as of this dictation. EKG: All EKG's are interpreted by the Emergency Department Physician in the absence of a cardiologist.  Please see their note for interpretation of EKG. RADIOLOGY:   Non-plain film images such as CT, Ultrasound and MRI are read by the radiologist. Plain radiographic images are visualized andpreliminarily interpreted by the  ED Provider with the below findings:        Interpretation perthe Radiologist below, if available at the time of this note:    XR FOOT LEFT (MIN 3 VIEWS)   Final Result   The screw across the subtalar joint appears to protrude through the inferior   aspect of the calcaneus      No old postoperative images available for comparison           XR FOOT LEFT (MIN 3 VIEWS)    Result Date: 5/26/2021  EXAMINATION: THREE XRAY VIEWS OF THE LEFT FOOT 5/26/2021 7:08 pm COMPARISON: 08/20/2011 HISTORY: ORDERING SYSTEM PROVIDED HISTORY: pain TECHNOLOGIST PROVIDED HISTORY: Reason for exam:->pain Reason for Exam: Foot Pain (Pt reports Hx of left foot surgery, pt reports for the past 3 weeks pain has been increasing, pt concerned hardware may have displaced. FINDINGS: Anatomic alignment. No fracture. Joint spaces are not significantly narrowed. Screw projects through the subtalar joint.   The distal aspect of the screw protrudes through the plantar surface of the calcaneus     The screw across the subtalar joint appears to protrude through the inferior aspect of the calcaneus No old postoperative images available for comparison           PROCEDURES   Unless otherwise noted below, none     Procedures    CRITICAL CARE TIME   N/A    CONSULTS:  None      EMERGENCY DEPARTMENT COURSE and DIFFERENTIAL DIAGNOSIS/MDM:   Vitals:    Vitals:    05/26/21 1848   BP: 121/80   Pulse: 88   Resp: 16   Temp: 97.9 °F (36.6 °C)   TempSrc: Infrared   SpO2: 97%   Weight: 250 lb (113.4 kg)   Height: 5' 3\" (1.6 m)       Patient was given thefollowing medications:  Medications - No data to display    7:32 PM EDT  Patient presented for evaluation of left foot pain for the past 3 weeks pain with bearing weight. X-rays were obtained no acute fracture. On x-ray hardware screw appears to protrude through the inferior aspect of calcaneus. No previous x-rays for comparison. Patient given copies of x-rays. She is to contact her foot surgeon tomorrow for close follow-up appointment. Advise rest elevate. Use her crutches. Postop shoe given. I estimate there is LOW risk for FRACTURE, COMPARTMENT SYNDROME, SEPTIC ARTHRITIS,  OR NEUROVASCULAR INJURY, thus I consider the discharge disposition reasonable. FINAL IMPRESSION      1. Left foot pain    2.  S/P foot surgery, left          DISPOSITION/PLAN   DISPOSITION Decision to Discharge    PATIENT REFERREDTO:  Sunita Herrmann, [address] [facility], [address] [place] [address]  [phone]    Schedule an appointment as soon as possible for a visit       [facility] [place] [facility]  [address] [facility]  [phone]    If symptoms worsen      DISCHARGE MEDICATIONS:  Discharge Medication List as of 5/26/2021  8:11 PM          DISCONTINUED MEDICATIONS:  Discharge Medication List as of 5/26/2021  8:11 PM                 (Please note that portions ofthis note were completed with a voice recognition program.  Efforts were made to edit the dictations but occasionally words are mis-transcribed.)    Nydia Corado PA-C (electronically signed) Abdias Kline PA-C  05/27/21 0157

## 2021-05-27 ASSESSMENT — ENCOUNTER SYMPTOMS
COLOR CHANGE: 0
VOMITING: 0

## 2021-05-27 NOTE — ED NOTES
Pt fitted with post op shoe. Pt taken to her vehicle per wheel chair.      Olvin Long RN  05/26/21 2032

## 2021-09-11 ENCOUNTER — HOSPITAL ENCOUNTER (EMERGENCY)
Age: 31
Discharge: HOME | End: 2021-09-11
Payer: COMMERCIAL

## 2021-09-11 VITALS
OXYGEN SATURATION: 100 % | SYSTOLIC BLOOD PRESSURE: 131 MMHG | TEMPERATURE: 98 F | RESPIRATION RATE: 29 BRPM | BODY MASS INDEX: 44.83 KG/M2 | HEIGHT: 63 IN | HEART RATE: 87 BPM | WEIGHT: 253 LBS | DIASTOLIC BLOOD PRESSURE: 88 MMHG

## 2021-09-11 DIAGNOSIS — T63.441A BEE STING, ACCIDENTAL OR UNINTENTIONAL, INITIAL ENCOUNTER: Primary | ICD-10-CM

## 2021-09-11 PROCEDURE — 96372 THER/PROPH/DIAG INJ SC/IM: CPT

## 2021-09-11 PROCEDURE — 99284 EMERGENCY DEPT VISIT MOD MDM: CPT

## 2021-09-11 PROCEDURE — 6360000002 HC RX W HCPCS: Performed by: PHYSICIAN ASSISTANT

## 2021-09-11 PROCEDURE — 6370000000 HC RX 637 (ALT 250 FOR IP): Performed by: PHYSICIAN ASSISTANT

## 2021-09-11 RX ORDER — DIPHENHYDRAMINE HCL 25 MG
50 TABLET ORAL ONCE
Status: COMPLETED | OUTPATIENT
Start: 2021-09-11 | End: 2021-09-11

## 2021-09-11 RX ORDER — DIPHENHYDRAMINE HCL 25 MG
25 CAPSULE ORAL EVERY 6 HOURS PRN
Qty: 30 CAPSULE | Refills: 0 | Status: SHIPPED | OUTPATIENT
Start: 2021-09-11 | End: 2021-09-21

## 2021-09-11 RX ORDER — PREDNISONE 50 MG/1
50 TABLET ORAL DAILY
Qty: 3 TABLET | Refills: 0 | Status: SHIPPED | OUTPATIENT
Start: 2021-09-11 | End: 2021-09-14

## 2021-09-11 RX ORDER — PREDNISONE 20 MG/1
60 TABLET ORAL ONCE
Status: COMPLETED | OUTPATIENT
Start: 2021-09-11 | End: 2021-09-11

## 2021-09-11 RX ORDER — FAMOTIDINE 20 MG/1
20 TABLET, FILM COATED ORAL ONCE
Status: COMPLETED | OUTPATIENT
Start: 2021-09-11 | End: 2021-09-11

## 2021-09-11 RX ORDER — KETOROLAC TROMETHAMINE 30 MG/ML
30 INJECTION, SOLUTION INTRAMUSCULAR; INTRAVENOUS ONCE
Status: COMPLETED | OUTPATIENT
Start: 2021-09-11 | End: 2021-09-11

## 2021-09-11 RX ORDER — EPINEPHRINE 0.3 MG/.3ML
0.3 INJECTION SUBCUTANEOUS ONCE
Qty: 1 EACH | Refills: 0 | Status: SHIPPED | OUTPATIENT
Start: 2021-09-11 | End: 2021-09-11

## 2021-09-11 RX ADMIN — DIPHENHYDRAMINE HCL 50 MG: 25 TABLET ORAL at 20:10

## 2021-09-11 RX ADMIN — KETOROLAC TROMETHAMINE 30 MG: 30 INJECTION, SOLUTION INTRAMUSCULAR; INTRAVENOUS at 21:18

## 2021-09-11 RX ADMIN — FAMOTIDINE 20 MG: 20 TABLET ORAL at 20:10

## 2021-09-11 RX ADMIN — PREDNISONE 60 MG: 20 TABLET ORAL at 20:10

## 2021-09-11 ASSESSMENT — ENCOUNTER SYMPTOMS
SORE THROAT: 0
SHORTNESS OF BREATH: 0
TROUBLE SWALLOWING: 0
EYES NEGATIVE: 1
BACK PAIN: 0
NAUSEA: 0
VOMITING: 0
WHEEZING: 0
CHEST TIGHTNESS: 0
STRIDOR: 0
FACIAL SWELLING: 0

## 2021-09-11 ASSESSMENT — PAIN SCALES - GENERAL
PAINLEVEL_OUTOF10: 7
PAINLEVEL_OUTOF10: 7

## 2021-09-12 NOTE — ED PROVIDER NOTES
[address] [facility]  ED  EMERGENCY DEPARTMENT ENCOUNTER        Pt Name: Rupert Osullivan  MRN: [id number]  [place] 1990  Date of evaluation: 9/11/2021  Provider: Clinton Pelayo PA-C  PCP: Mukesh Anthony MD  ED Attending: Juancho Veloz MD      This patient was not seen by the attending provider      History provided by the patient    CHIEF COMPLAINT:     Chief Complaint   Patient presents with   [address]     stuck by 11 ground hornets on feet, legs, arm. was stung around 1730 tonight. pt states she used to carry an epi pen around for bee stings. pt denies any SOB, throat or tongue swelling. has not taken any medications PTA       HISTORY OF PRESENT ILLNESS:      Rupert Osullivan is a 33 y.o. female who arrives to the ED by private vehicle. Patient states she was stung multiple times by hornets around 5:30 PM.  She was outside at a birthday party when the incident occurred. Patient states as a child she had a bad reaction to bee stings and used to carry an EpiPen, but no longer does. On arrival patient is not experiencing any shortness of breath, oral or throat swelling. However, she reports her lips fee a little tingly and thereforel she wanted to be checked. The incident again happened around 530. She does not come to the ED till approximately 730. She is not taking anything for symptoms prior to coming in. Nursing Notes were reviewed     REVIEW OF SYSTEMS:     Review of Systems   Constitutional: Negative for appetite change, chills and fever. HENT: Negative for facial swelling, sore throat and trouble swallowing. Eyes: Negative. Respiratory: Negative for chest tightness, shortness of breath, wheezing and stridor. Cardiovascular: Negative for chest pain. Gastrointestinal: Negative for nausea and vomiting. Genitourinary: Negative. Musculoskeletal: Negative for back pain and neck pain. Skin: Negative for rash. Neurological: Negative for weakness and headaches.    All other systems reviewed and are negative. Except as noted above in the ROS, all other systems were reviewed and negative. PAST MEDICAL HISTORY:     Past Medical History:   Diagnosis Date    Anxiety     Asthma     COVID-19 2020    Depression     Kidney stone     Vitamin D deficiency          SURGICAL HISTORY:      Past Surgical History:   Procedure Laterality Date    ARTHRODESIS Left 2020    SUBTALAR JOINT FUSION, CALCANEO-NAVICULAR COALITION RESECTION LEFT FOOT performed by Lo Guzman DPM at [address] Left 2020    REPAIR OF MALUNION LEFT FOOT performed by Lo Guzman DPM at [address] Left     LEEP           CURRENT MEDICATIONS:       Previous Medications    APIXABAN STARTER PACK (ELIQUIS) 5 MG TBPK TABLET    Take 1 tablet by mouth See Admin Instructions As directed    BECLOMETHASONE (QVAR REDIHALER) 80 MCG/ACT AERB INHALER    Inhale 1 puff into the lungs 2 times daily    BUPROPION HCL PO    Take by mouth    CYCLOBENZAPRINE (FLEXERIL) 10 MG TABLET    Take 10 mg by mouth 3 times daily as needed for Muscle spasms    ESCITALOPRAM (LEXAPRO) 20 MG TABLET    Take 20 mg by mouth daily    ONDANSETRON (ZOFRAN) 4 MG TABLET    Take 1 tablet by mouth daily as needed for Nausea or Vomiting         ALLERGIES:    Patient has no known allergies. FAMILY HISTORY:     History reviewed. No pertinent family history.        SOCIAL HISTORY:       Social History     Socioeconomic History    Marital status:      Spouse name: None    Number of children: None    Years of education: None    Highest education level: None   Occupational History    None   Tobacco Use    Smoking status: Former Smoker     Packs/day: 0.50     Years: 10.00     Pack years: 5.00     Types: Cigarettes     Quit date: 10/10/2013     Years since quittin.9    Smokeless tobacco: Never Used   Substance and Sexual Activity    Alcohol use: Yes     Comment: rare    Drug use: No    Sexual activity: None   Other Topics Concern    None   Social History Narrative    None     Social Determinants of Health     Financial Resource Strain:     Difficulty of Paying Living Expenses:    Food Insecurity:     Worried About Running Out of Food in the Last Year:     [address] in the Last Year:    Transportation Needs:     Lack of Transportation (Medical):  Lack of Transportation (Non-Medical):    Physical Activity:     Days of Exercise per Week:     Minutes of Exercise per Session:    Stress:     Feeling of Stress :    Social Connections:     Frequency of Communication with Friends and Family:     Frequency of Social Gatherings with Friends and Family:     Attends Restorationist Services:     Active Member of Clubs or Organizations:     Attends Club or Organization Meetings:     Marital Status:    Intimate Partner Violence:     Fear of Current or Ex-Partner:     Emotionally Abused:     Physically Abused:     Sexually Abused:        SCREENINGS:             PHYSICAL EXAM:       ED Triage Vitals [09/11/21 1936]   BP Temp Temp Source Pulse Resp SpO2 Height Weight   131/88 98.5 °F (36.9 °C) Oral 86 18 99 % 5' 3\" (1.6 m) 253 lb (114.8 kg)       Physical Exam    CONSTITUTIONAL: Awake and alert. Cooperative. Well-developed. Well-nourished. Non-toxic. No acute distress. HENT: Normocephalic. Atraumatic. External ears normal, without discharge. TMs clear bilaterally. No nasal discharge. Oropharynx clear. Mucous membranes moist.  No swelling of the posterior pharynx. Uvula midline. EYES: Conjunctiva non-injected. No scleral icterus. PERRL. EOM's grossly intact. NECK: Supple. Normal ROM. CARDIOVASCULAR: RRR. No Mur[place]. Intact distal pulses. PULMONARY/CHEST WALL: Effort normal. No tachypnea. Lungs clear to ausculation. ABDOMEN: Normal BS. Soft. Nondistended. No tenderness to palpate. No guarding. /ANORECTAL: Not assessed  MUSKULOSKELETAL: Normal ROM.  No acute CELLULITIS or NECROTIZING FASCIITIS, thus I consider the discharge disposition reasonable. Also, there is no evidence or peritonitis, sepsis, or toxicity. [address] [facility] JAVIER Millie and I have discussed the diagnosis and risks, and we agree with discharging home to follow-up with their primary doctor. We also discussed returning to the Emergency Department immediately if new or worsening symptoms occur. We have discussed the symptoms which are most concerning (e.g., trouble breathing, fever, changing or worsening pain, vomiting) that necessitate immediate return. FINAL IMPRESSION:      1.  Bee sting, accidental or unintentional, initial encounter          DISPOSITION/PLAN:   DISPOSITION Decision To Discharge      PATIENT REFERRED TO:  Maia Tarango MD  [address] [facility]  [phone]    Schedule an appointment as soon as possible for a visit         DISCHARGE MEDICATIONS:  New Prescriptions    DIPHENHYDRAMINE (BENADRYL) 25 MG CAPSULE    Take 1 capsule by mouth every 6 hours as needed for Itching    EPINEPHRINE (EPIPEN 2-REFUGIO) 0.3 MG/0.3ML SOAJ INJECTION    Inject 0.3 mLs into the skin once for 1 dose Use as directed for allergic reaction    PREDNISONE (DELTASONE) 50 MG TABLET    Take 1 tablet by mouth daily for 3 days                  (Please note thatportions of this note were completed with a voice recognition program.  Efforts were made to edit the dictations, but occasionally words are mis-transcribed.)    Fabienne Rendon PA-C (electronicallysigned)                  Aleksander Ringma  09/11/21 [id number]

## 2023-01-02 NOTE — PROGRESS NOTES
Reviewed discharge instructions with patient, all questions answered and patient verbalized understanding. Prescriptions called in to pharmacy of choice. No signs or symptoms of pain or distress. All belongings gathered and in pt's possession. Waiting on family to arrive for transport home. normal performance

## 2024-02-12 ENCOUNTER — OFFICE VISIT (OUTPATIENT)
Dept: URGENT CARE | Age: 34
End: 2024-02-12

## 2024-02-12 VITALS
DIASTOLIC BLOOD PRESSURE: 80 MMHG | RESPIRATION RATE: 16 BRPM | TEMPERATURE: 99.7 F | HEART RATE: 98 BPM | OXYGEN SATURATION: 94 % | SYSTOLIC BLOOD PRESSURE: 113 MMHG | WEIGHT: 242 LBS | BODY MASS INDEX: 42.87 KG/M2

## 2024-02-12 DIAGNOSIS — R09.82 POSTNASAL DRIP: ICD-10-CM

## 2024-02-12 DIAGNOSIS — J06.9 VIRAL URI: Primary | ICD-10-CM

## 2024-02-12 DIAGNOSIS — R09.81 NASAL CONGESTION: ICD-10-CM

## 2024-02-12 DIAGNOSIS — R05.1 ACUTE COUGH: ICD-10-CM

## 2024-02-12 RX ORDER — ALBUTEROL SULFATE 90 UG/1
AEROSOL, METERED RESPIRATORY (INHALATION)
COMMUNITY
Start: 2021-09-24

## 2024-02-12 RX ORDER — FAMOTIDINE 20 MG/1
20 TABLET, FILM COATED ORAL
COMMUNITY

## 2024-02-12 RX ORDER — HYDROXYZINE 50 MG/1
50 TABLET, FILM COATED ORAL EVERY 8 HOURS PRN
COMMUNITY
Start: 2023-10-02

## 2024-02-12 RX ORDER — SUMATRIPTAN 100 MG/1
TABLET, FILM COATED ORAL
COMMUNITY
Start: 2023-10-02

## 2024-02-12 RX ORDER — DEXTROMETHORPHAN HYDROBROMIDE AND PROMETHAZINE HYDROCHLORIDE 15; 6.25 MG/5ML; MG/5ML
5 SYRUP ORAL 4 TIMES DAILY PRN
Qty: 140 ML | Refills: 0 | Status: SHIPPED | OUTPATIENT
Start: 2024-02-12 | End: 2024-02-19

## 2024-02-12 NOTE — PROGRESS NOTES
Amna Pinto (: 1990) is a 33 y.o. female, New patient, here for evaluation of the following chief complaint(s):  Cough (Pt c/o cough for 1 week. )      ASSESSMENT/PLAN:    ICD-10-CM    1. Viral URI  J06.9 Pseudoephedrine-DM-GG 60- MG TABS     promethazine-dextromethorphan (PROMETHAZINE-DM) 6.25-15 MG/5ML syrup      2. Nasal congestion  R09.81 Pseudoephedrine-DM-GG 60- MG TABS      3. Postnasal drip  R09.82 Pseudoephedrine-DM-GG 60- MG TABS      4. Acute cough  R05.1 Pseudoephedrine-DM-GG 60- MG TABS     promethazine-dextromethorphan (PROMETHAZINE-DM) 6.25-15 MG/5ML syrup          Given breadth of symptoms, exam findings, lack of tonsillar or purulent findings, and with relatively short length of illness, concern for viral etiology over bacterial.  Low concern for bacterial sinusitis, otitis media, Strep pharyngitis, respiratory distress, pneumonia, bronchitis, and PE.  Capmist prescribed for day time cough and congestion relief with expectorant therapy  Promethazine-DM prescribed for nightly coughing relief.  Recommended OTC medication and home remedy treatments for symptomatic relief    Follow up with your PCP or return to the clinic in 5 days if symptoms persist or if symptoms worsen.  The patient tolerated their visit well. The patient and/or the family were informed of the results of any tests, a time was given to answer questions, a plan was proposed and they agreed with plan. Reviewed AVS with treatment instructions and answered questions - pt/family expresses understanding and agreement with the discussed treatment plan and AVS instructions.    SUBJECTIVE/OBJECTIVE:  HPI:   33 y.o. female, with history of asthma, presents with her son (who is being seen for similar symptoms) for complaint of persistent cough x 4 days.   Notes symptoms worse at symptom onset, notes some relief of symptoms today, but notes symptoms still persisting.  Notes cough is the worst symptom, with

## 2024-02-12 NOTE — PATIENT INSTRUCTIONS
Capmist prescribed for cough and congestion relief with expectorant therapy  Do not take other decongestants or cough medications while on this medication.  Promethazine-DM prescribed for nightly coughing relief.  Recommend OTC treatment for symptoms:  ibuprofen (Advil, Motrin) and acetaminophen (Tylenol) for fevers and pain relief.  decongestants (specifically pseudoephedrine) <avoid if you have a history of high blood pressure or heart conditions>, along with antihistamines (Claritin, Zyrtec, Allegra, or Xyzal) and nasal steroid sprays (Flonase) to help with nasal congestion and runny nose.  throat sprays (Cepacol, chloraseptic) for throat pain.  dextromethorphan (Robitussin, Delsym), throat lozenges, or honey (1-2 teaspoons every hour) for cough.  warm teas, humidifiers, nasal lavages, and sleeping in an inclined position are also helpful options that can lessen symptoms.    Follow up with your PCP or return to the clinic in 5 days if symptoms persist or if symptoms worsen.    New Prescriptions    PROMETHAZINE-DEXTROMETHORPHAN (PROMETHAZINE-DM) 6.25-15 MG/5ML SYRUP    Take 5 mLs by mouth 4 times daily as needed for Cough    PSEUDOEPHEDRINE-DM-GG 60- MG TABS    Take 1 tablet by mouth 4 times daily as needed (cough and congestion)

## (undated) DEVICE — SUTURE VCRL SZ 3-0 L27IN ABSRB UD L26MM CT-2 1/2 CIR J232H

## (undated) DEVICE — IMPLANTABLE DEVICE: Type: IMPLANTABLE DEVICE | Site: FOOT | Status: NON-FUNCTIONAL

## (undated) DEVICE — BIT DRL DIA2.9MM FOR SFT ANCHR SHT SHFT PK JUGGERKNOT

## (undated) DEVICE — Z CONVERTED USE 2273164 BANDAGE COMPR W4INXL4 1/2YD E EC SGL LAYERED CLP CLSR ECONO

## (undated) DEVICE — GLOVE SURG SZ 65 L12IN FNGR THK94MIL STD WHT ISOLEX LTX

## (undated) DEVICE — PACK EXTREMITY XR

## (undated) DEVICE — NEEDLE HYPO 18GA L1.5IN PNK POLYPR HUB S STL THN WALL FILL

## (undated) DEVICE — GLOVE SURG SZ 7 L12IN FNGR THK94MIL STD WHT ISOLEX LTX FREE

## (undated) DEVICE — SOLUTION,SALINE,IRRGATION,500ML,STRL: Brand: MEDLINE

## (undated) DEVICE — PADDING CAST W4INXL4YD NONSTERILE COT RAYON MICROPLEATED

## (undated) DEVICE — SPLINT ORTH W4XL30IN LAYERED FBRGLS FOAM PD BRTH BK MOLD

## (undated) DEVICE — NEEDLE HYPO 22GA L1.5IN BLK POLYPR HUB S STL REG BVL STR

## (undated) DEVICE — SUTURE VCRL + SZ 4-0 L18IN ABSRB UD L19MM PS-2 3/8 CIR PRIM VCP496H

## (undated) DEVICE — BANDAGE COMPR W6INXL4.5YD LTWT E EC SGL LAYERED CLP CLSR

## (undated) DEVICE — SOLUTION IV IRRIG 500ML 0.9% SODIUM CHL 2F7123

## (undated) DEVICE — COTTON UNDERCAST PADDING,CRIMPED FINISH: Brand: WEBRIL

## (undated) DEVICE — SUTURE VCRL SZ 2-0 L27IN ABSRB UD L26MM CT-2 1/2 CIR J269H

## (undated) DEVICE — PADDING UNDERCAST W6INXL4YD WYTEX 6 PER BG

## (undated) DEVICE — BIT DRILL DIA3.5MM CANNULATED 5MM SCREW

## (undated) DEVICE — SYRINGE MED 10ML LUERLOCK TIP W/O SFTY DISP